# Patient Record
Sex: MALE | Race: WHITE | Employment: OTHER | URBAN - METROPOLITAN AREA
[De-identification: names, ages, dates, MRNs, and addresses within clinical notes are randomized per-mention and may not be internally consistent; named-entity substitution may affect disease eponyms.]

---

## 2017-03-07 ENCOUNTER — OFFICE VISIT (OUTPATIENT)
Dept: INTERNAL MEDICINE CLINIC | Age: 67
End: 2017-03-07

## 2017-03-07 VITALS
DIASTOLIC BLOOD PRESSURE: 90 MMHG | HEIGHT: 68 IN | HEART RATE: 83 BPM | WEIGHT: 203 LBS | SYSTOLIC BLOOD PRESSURE: 148 MMHG | TEMPERATURE: 97.9 F | RESPIRATION RATE: 18 BRPM | OXYGEN SATURATION: 95 % | BODY MASS INDEX: 30.77 KG/M2

## 2017-03-07 DIAGNOSIS — Z00.00 ROUTINE GENERAL MEDICAL EXAMINATION AT A HEALTH CARE FACILITY: Primary | ICD-10-CM

## 2017-03-07 NOTE — MR AVS SNAPSHOT
Visit Information Date & Time Provider Department Dept. Phone Encounter #  
 3/7/2017  9:00 AM Billie Pickering, [address] [id number] Follow-up Instructions Return in about 6 months (around 9/7/2017) for follow up htn, hld, and hypothyroid. Your Appointments 3/9/2017  9:30 AM  
6 MONTH with Hamzah Courtney MD  
[facility]) Appt Note: r/s'd w/ pt, Dr. Keke Rosales off; 110-11 pt resched appt to march, says they are doing okay-lj 10-11  
 [id number] [facility]  
[phone] [id number] [facility] Upcoming Health Maintenance Date Due Hepatitis C Screening 1950 DTaP/Tdap/Td series (1 - Tdap) 4/14/1972 FOBT Q 1 YEAR AGE 50-75 4/14/2001 ZOSTER VACCINE AGE 60> 4/14/2011 GLAUCOMA SCREENING Q2Y 4/14/2016 Pneumococcal 65+ Low/Medium Risk (1 of 2 - PCV13) 4/14/2016 MEDICARE YEARLY EXAM 4/14/2016 INFLUENZA AGE 9 TO ADULT 8/1/2016 Allergies as of 3/7/2017  Review Complete On: 3/7/2017 By: Billie Pickering MD  
 No Known Allergies Current Immunizations  Reviewed on 6/12/2012 No immunizations on file. Not reviewed this visit You Were Diagnosed With   
  
 Codes Comments Routine general medical examination at a health care facility    -  Primary ICD-10-CM: Z00.00 ICD-9-CM: V70.0 Vitals BP Pulse Temp Resp Height(growth percentile) Weight(growth percentile) 148/90 (BP 1 Location: Left arm, BP Patient Position: Sitting) 83 97.9 °F (36.6 °C) (Oral) 18 5' 8\" (1.727 m) 203 lb (92.1 kg) SpO2 BMI Smoking Status 95% 30.87 kg/m2 Former Smoker BMI and BSA Data Body Mass Index Body Surface Area  
 30.87 kg/m 2 2.1 m 2 Preferred Pharmacy Pharmacy Name Phone [facility] [address] [place] [address] Avenue [phone] Your Updated Medication List  
  
   
 This list is accurate as of: 3/7/17  9:59 AM.  Always use your most recent med list.  
  
  
  
  
 aspirin 81 mg chewable tablet Take 2 Tabs by mouth daily. atorvastatin 40 mg tablet Commonly known as:  LIPITOR Take 1 Tab by mouth daily. coenzyme Q-10 200 mg capsule Commonly known as:  CO Q-10 Take 1 Cap by mouth daily. Over the counter to help prevent leg aches  
  
 diclofenac 1 % Gel Commonly known as:  VOLTAREN Apply 4 g to affected area four (4) times daily. levothyroxine 150 mcg tablet Commonly known as:  SYNTHROID Take 1 Tab by mouth Daily (before breakfast). lisinopril 5 mg tablet Commonly known as:  Jacklynn Pares Take 1 Tab by mouth daily. metoprolol succinate 25 mg XL tablet Commonly known as:  TOPROL-XL Take 0.5 Tabs by mouth daily. nitroglycerin 0.4 mg SL tablet Commonly known as:  NITROSTAT  
1 Tab by SubLINGual route every five (5) minutes as needed for Chest Pain. pneumococcal 13 loulou conj dip 0.5 mL Syrg injection Commonly known as:  PREVNAR 13 (PF)  
0.5 mL by IntraMUSCular route PRIOR TO DISCHARGE for 1 dose. potassium 99 mg tablet Take 99 mg by mouth daily. Prescriptions Printed Refills  
 pneumococcal 13 loulou conj dip (PREVNAR 13, PF,) 0.5 mL syrg injection 0 Si.5 mL by IntraMUSCular route PRIOR TO DISCHARGE for 1 dose. Class: Print Route: IntraMUSCular We Performed the Following CBC WITH AUTOMATED DIFF [25264 CPT(R)] HEMOGLOBIN A1C WITH EAG [73058 CPT(R)] LIPID PANEL [65846 CPT(R)] METABOLIC PANEL, COMPREHENSIVE [16286 CPT(R)] PROSTATE SPECIFIC AG (PSA) C8124845 CPT(R)] T4, FREE C143362 CPT(R)] TSH 3RD GENERATION [05445 CPT(R)] Follow-up Instructions Return in about 6 months (around 2017) for follow up htn, hld, and hypothyroid. To-Do List   
 2017 Imaging:  XR CHEST PA LAT Introducing [address]! Dear Zahra [place]: Thank you for requesting a [facility] account. Our records indicate that you already have an active [facility] account. You can access your account anytime at [email or web address] Did you know that you can access your hospital and ER discharge instructions at any time in [facility]? You can also review all of your test results from your hospital stay or ER visit. Additional Information If you have questions, please visit the Frequently Asked Questions section of the [facility] website at [email or web address] Remember, [facility] is NOT to be used for urgent needs. For medical emergencies, dial 911. Now available from your iPhone and Android! Please provide this summary of care documentation to your next provider. Your primary care clinician is listed as Nirmala Todd. If you have any questions after today's visit, please call [phone].

## 2017-03-07 NOTE — PROGRESS NOTES
1. Have you been to the ER, urgent care clinic since your last visit? Hospitalized since your last visit?no    2. Have you seen or consulted any other health care providers outside of the [address] [place] [address] since your last visit? Include any pap smears or colon screening.  no

## 2017-03-07 NOTE — PROGRESS NOTES
SUBJECTIVE:   Delmar Lennon is a 73 y.o. male who is here for complete physical exam. Pt is not fasting today. Pt's BP is slightly elevated at 148/90 today. Pt c/o fatigue. Pt states he has been feeling dizzy recently. Pt reports having an appointment with Dr. Betzy Shell (cardiology) on Thursday. Pt reports last time he felt like this he had walking pneumonia. Pt reports he had a productive cough the past few weeks. Pt states his last labs were over 400 dollars. Pt denies seeing urology for elevated PSA. Pt's PSA was 7.8 on 2/23/16. Pt states he has the contact information for Dr. Mark Mckeon (urology). Pt reports episodes of blurred vision x years. Pt denies seeing ophthalmology. Pt states he has had some abdominal issues recently. Pt endorses having occasional nausea and heart burn. Pt claims he has noticed memory changes. Pt states he sniffed cocaine in the 1970s and is wondering if this is the cause of the memory changes. Pt reports taking Potassium for his joints. Pt endorses taking Aleve for occasional knee pain. Hep C: not ordered today due to cost.   Tdap: due  Zostavax: due  Pneumonia: due. Pt specifically denies changes in vision or hearing, trouble with swallowing or taste, CP, SOB, heartburn or upset stomach, change in bowel habits, problems urinating, unusual joint or muscle pains, numbness or tingling in extremities, or skin lesions of concern. At this time, he is otherwise doing well and has brought no other complaints to my attention today. For a list of the medical issues addressed today, see the assessment and plan below.       PMH:   Past Medical History:   Diagnosis Date    HTN (hypertension)     Hypercholesterolemia     NSTEMI (non-ST elevated myocardial infarction) ([facility]     Other ill-defined conditions(799.89)     thyroid, high cholesterol    Thyroid disease     Troponin I above reference range        PSH:  has a past surgical history that includes heent. Allergies: has No Known Allergies. Meds:   Current Outpatient Prescriptions   Medication Sig    potassium 99 mg tablet Take 99 mg by mouth daily.  pneumococcal 13 loulou conj dip (PREVNAR 13, PF,) 0.5 mL syrg injection 0.5 mL by IntraMUSCular route PRIOR TO DISCHARGE for 1 dose.  levothyroxine (SYNTHROID) 150 mcg tablet Take 1 Tab by mouth Daily (before breakfast).  lisinopril (PRINIVIL, ZESTRIL) 5 mg tablet Take 1 Tab by mouth daily.  metoprolol succinate (TOPROL-XL) 25 mg XL tablet Take 0.5 Tabs by mouth daily.  atorvastatin (LIPITOR) 40 mg tablet Take 1 Tab by mouth daily.  coenzyme Q-10 (CO Q-10) 200 mg capsule Take 1 Cap by mouth daily. Over the counter to help prevent leg aches    nitroglycerin (NITROSTAT) 0.4 mg SL tablet 1 Tab by SubLINGual route every five (5) minutes as needed for Chest Pain.  aspirin 81 mg chewable tablet Take 2 Tabs by mouth daily.  diclofenac (VOLTAREN) 1 % gel Apply 4 g to affected area four (4) times daily. No current facility-administered medications for this visit. Fam hx: family history includes Heart Disease in his father; Hypertension in his mother; Stroke in his mother. Soc hx:  reports that he quit smoking about 27 years ago. He quit after 20.00 years of use. He has never used smokeless tobacco. He reports that he drinks alcohol. He reports that he does not use illicit drugs.       Review of Systems - History obtained from the patient  General ROS: +fatigue, otherwise negative  Psychological ROS: negative  Ophthalmic ROS: negative  ENT ROS: negative  Respiratory ROS: no cough, shortness of breath, or wheezing  Cardiovascular ROS: no chest pain or dyspnea on exertion  Gastrointestinal ROS: no abdominal pain, change in bowel habits, or black or bloody stools  Genito-Urinary ROS: negative  Musculoskeletal ROS: negative  Neurological ROS: negative  Dermatological ROS: negative    OBJECTIVE:   Vitals:   Visit Vitals    /90 (BP 1 Location: Left arm, BP Patient Position: Sitting)    Pulse 83    Temp 97.9 °F (36.6 °C) (Oral)    Resp 18    Ht 5' 8\" (1.727 m)    Wt 203 lb (92.1 kg)    SpO2 95%    BMI 30.87 kg/m2     Gen: Pleasant 73 y.o. male in NAD. HEENT: PERRLA. EOMI. OP moist and pink. EARS: TMs normal and canals equal bilaterally. NECK: Supple. No LAD. No thyromegaly. HEART: RRR, No M/G/R.   LUNGS: CTAB No W/R. ABDOMEN: S, NT, ND, BS+. EXTREMITIES: Warm. No C/C/E.  MUSCULOSKELETAL: Normal ROM, muscle strength 5/5 all groups. NEURO: Alert and oriented x 3. Cranial nerves grossly intact. No focal sensory or motor deficits noted. SKIN: Warm. Dry. No rashes or other lesions noted. ASSESSMENT/ PLAN:     Godfrey Jiménez was seen today for complete physical and other. Diagnoses and all orders for this visit:    Routine general medical examination at a health care facility  -     METABOLIC PANEL, COMPREHENSIVE  -     LIPID PANEL  -     T4, FREE  -     CBC WITH AUTOMATED DIFF  -     TSH 3RD GENERATION  -     PSA - PROSTATE SPECIFIC AG  -     HEMOGLOBIN A1C WITH EAG  -     pneumococcal 13 loulou conj dip (PREVNAR 13, PF,) 0.5 mL syrg injection; 0.5 mL by IntraMUSCular route PRIOR TO DISCHARGE for 1 dose. -     XR CHEST PA LAT; Future      Pt was advised to see urology for elevated PSA. Pt may hold on Hgb A1C and CBC labs to decrease cost.     I advised the pt to consider getting the pneumonia vaccines. Pt was given rxs for Prevnar-13 for routine vaccination. Pt was instructed to sign up for Medicare to help cover health care costs. I ordered a chest XR for investigation of fatigue. Monse Pena's physical exam was normal and urinalysis was clear. Pt was given lab orders for a CBC, CMP, lipid panel, PSA, Hgb A1C, T4, and TSH to have done when he is fasting. Pt will f/u in 6 months for htn, hld, and hypothyroid.      Follow-up Disposition:  Return in about 6 months (around 9/7/2017) for follow up htn, hld, and hypothyroid. I have reviewed the patient's medications and risks/side effects/benefits were discussed. Diagnosis(-es) explained to patient and questions answered. Literature provided where appropriate.      Written by Golden Osler, as dictated by Abbie Kelly MD.

## 2017-03-09 ENCOUNTER — OFFICE VISIT (OUTPATIENT)
Dept: CARDIOLOGY CLINIC | Age: 67
End: 2017-03-09

## 2017-03-09 ENCOUNTER — APPOINTMENT (OUTPATIENT)
Dept: INTERNAL MEDICINE CLINIC | Age: 67
End: 2017-03-09

## 2017-03-09 VITALS
DIASTOLIC BLOOD PRESSURE: 82 MMHG | SYSTOLIC BLOOD PRESSURE: 132 MMHG | HEIGHT: 68 IN | HEART RATE: 82 BPM | BODY MASS INDEX: 30.9 KG/M2 | RESPIRATION RATE: 18 BRPM | WEIGHT: 203.9 LBS | OXYGEN SATURATION: 96 %

## 2017-03-09 DIAGNOSIS — E78.00 HYPERCHOLESTEROLEMIA: ICD-10-CM

## 2017-03-09 DIAGNOSIS — I10 ESSENTIAL HYPERTENSION: Chronic | ICD-10-CM

## 2017-03-09 DIAGNOSIS — I25.10 CORONARY ARTERY DISEASE INVOLVING NATIVE CORONARY ARTERY OF NATIVE HEART, ANGINA PRESENCE UNSPECIFIED: ICD-10-CM

## 2017-03-09 DIAGNOSIS — I21.4 NSTEMI (NON-ST ELEVATED MYOCARDIAL INFARCTION) (HCC): ICD-10-CM

## 2017-03-09 DIAGNOSIS — Z98.61 HISTORY OF PTCA: ICD-10-CM

## 2017-03-09 DIAGNOSIS — E07.9 THYROID DISEASE: ICD-10-CM

## 2017-03-09 DIAGNOSIS — I20.9 ANGINA PECTORIS (HCC): Primary | ICD-10-CM

## 2017-03-09 RX ORDER — NITROGLYCERIN 0.4 MG/1
0.4 TABLET SUBLINGUAL
Qty: 25 TAB | Refills: 1 | Status: SHIPPED | OUTPATIENT
Start: 2017-03-09

## 2017-03-09 NOTE — PROGRESS NOTES
Romaine Esqueda NP  Subjective/HPI:     Rena Garcia is a 73 y.o. male is here for routine f/u. The patient denies chest pain/ shortness of breath, orthopnea, PND, LE edema, palpitations, syncope, presyncope. Mr. Giselle Marroquin reports he is having significant episodes of overall weakness and fatigue and admits to waxing and waning episodes of some chest tightness without any specific triggers that her pain bothering him for several months. He states he has been seen by primary care and is pending lab evaluation and chest x-ray as this is how he felt with previous episodes of pneumonia. PCP Provider  Brittany Schwarz MD  Past Medical History:   Diagnosis Date    HTN (hypertension)     Hypercholesterolemia     NSTEMI (non-ST elevated myocardial infarction) ([facility] [address]     Other ill-defined conditions(799.89)     thyroid, high cholesterol    Thyroid disease     Troponin I above reference range       Past Surgical History:   Procedure Laterality Date    HX HEENT      tonsils     No Known Allergies   Family History   Problem Relation Age of Onset    Stroke Mother     Hypertension Mother     Heart Disease Father       Current Outpatient Prescriptions   Medication Sig    nitroglycerin (NITROSTAT) 0.4 mg SL tablet 1 Tab by SubLINGual route every five (5) minutes as needed for Chest Pain (call 911 if not relieved by 3).  potassium 99 mg tablet Take 99 mg by mouth daily.  pneumococcal 13 loulou conj dip (PREVNAR 13, PF,) 0.5 mL syrg injection 0.5 mL by IntraMUSCular route PRIOR TO DISCHARGE for 1 dose.  levothyroxine (SYNTHROID) 150 mcg tablet Take 1 Tab by mouth Daily (before breakfast).  lisinopril (PRINIVIL, ZESTRIL) 5 mg tablet Take 1 Tab by mouth daily.  metoprolol succinate (TOPROL-XL) 25 mg XL tablet Take 0.5 Tabs by mouth daily.  atorvastatin (LIPITOR) 40 mg tablet Take 1 Tab by mouth daily.  coenzyme Q-10 (CO Q-10) 200 mg capsule Take 1 Cap by mouth daily.  Over the counter to help prevent leg aches    aspirin 81 mg chewable tablet Take 2 Tabs by mouth daily.  diclofenac (VOLTAREN) 1 % gel Apply 4 g to affected area four (4) times daily. No current facility-administered medications for this visit. Vitals:    03/09/17 0909 03/09/17 0927   BP: 136/82 132/82   Pulse: 82    Resp: 18    SpO2: 96%    Weight: 203 lb 14.4 oz (92.5 kg)    Height: 5' 8\" (1.727 m)      Social History     Social History    Marital status: SINGLE     Spouse name: N/A    Number of children: N/A    Years of education: N/A     Occupational History    Not on file. Social History Main Topics    Smoking status: Former Smoker     Years: 20.00     Quit date: 1/1/1990    Smokeless tobacco: Never Used    Alcohol use Yes    Drug use: No    Sexual activity: Yes     Partners: Male     Other Topics Concern    Not on file     Social History Narrative       I have reviewed the nurses notes, vitals, problem list, allergy list, medical history, family, social history and medications. Review of Symptoms:    General: Pt denies excessive weight gain or loss. Pt is able to conduct ADL's patient reporting fatigue  HEENT: Denies blurred vision, headaches, epistaxis and difficulty swallowing. Respiratory: Denies shortness of breath, POWELL, wheezing or stridor. Cardiovascular: Positive chest pain, denies palpitations, edema or PND  Gastrointestinal: Denies poor appetite, indigestion, abdominal pain or blood in stool  Musculoskeletal: Denies pain or swelling from muscles or joints  Neurologic: Denies tremor, paresthesias. Reported to nurse episodes of lightheadedness however he states this seldom occurs  Skin: Denies rash, itching or texture change. Physical Exam:      General: Well developed, in no acute distress, cooperative and alert  HEENT: No carotid bruits, no JVD, trach is midline. Neck Supple, PEERL, EOM intact. Heart:  Normal S1/S2 negative S3 or S4.  Regular, no murmur, gallop or rub.   Respiratory: Clear bilaterally x 4, no wheezing or rales  Abdomen:   Soft, non-tender, no masses, bowel sounds are active.   Extremities:  No edema, normal cap refill, no cyanosis, atraumatic. Neuro: A&Ox3, speech clear, gait stable. Skin: Skin color is normal. No rashes or lesions. Non diaphoretic  Vascular: 2+ pulses symmetric in all extremities    Cardiographics    ECG: Sinus  Results for orders placed or performed during the hospital encounter of 06/12/12   EKG, 12 LEAD, INITIAL   Result Value Ref Range    Ventricular Rate 87 BPM    Atrial Rate 87 BPM    P-R Interval 150 ms    QRS Duration 96 ms    Q-T Interval 364 ms    QTC Calculation (Bezet) 438 ms    Calculated P Axis 47 degrees    Calculated R Axis -36 degrees    Calculated T Axis 58 degrees    Diagnosis       Normal sinus rhythm  Left axis deviation  Incomplete right bundle branch block    Confirmed by Justo Barney ([id number]) on 6/14/2012 12:35:09 PM         Cardiology Labs:  Lab Results   Component Value Date/Time    Cholesterol, total 136 03/09/2017 11:09 AM    HDL Cholesterol 39 03/09/2017 11:09 AM    LDL,Direct 131 05/29/2014 10:38 AM    LDL, calculated 77 03/09/2017 11:09 AM    Triglyceride 99 03/09/2017 11:09 AM    CHOL/HDL Ratio 5.4 06/13/2012 04:45 AM       Lab Results   Component Value Date/Time    Sodium 141 03/09/2017 11:09 AM    Potassium 5.2 03/09/2017 11:09 AM    Chloride 104 03/09/2017 11:09 AM    CO2 23 03/09/2017 11:09 AM    Anion gap 6 06/14/2012 05:00 AM    Glucose 96 03/09/2017 11:09 AM    BUN 18 03/09/2017 11:09 AM    Creatinine 1.15 03/09/2017 11:09 AM    BUN/Creatinine ratio 16 03/09/2017 11:09 AM    GFR est AA 77 03/09/2017 11:09 AM    GFR est non-AA 66 03/09/2017 11:09 AM    Calcium 9.6 03/09/2017 11:09 AM    Bilirubin, total 1.1 03/09/2017 11:09 AM    AST (SGOT) 20 03/09/2017 11:09 AM    Alk.  phosphatase 60 03/09/2017 11:09 AM    Protein, total 6.8 03/09/2017 11:09 AM    Albumin 4.3 03/09/2017 11:09 AM    Globulin 3.8 06/13/2012 04:45 AM    RICCI Ratio 1.7 03/09/2017 11:09 AM    ALT (SGPT) 24 03/09/2017 11:09 AM           Assessment:     Assessment:     Alexis Vasquez was seen today for hypertension. Diagnoses and all orders for this visit:    Angina pectoris ([facility]    Essential hypertension  -     AMB POC EKG ROUTINE W/ 12 LEADS, INTER & REP  -     ECHO TTE STRESS EXRCSE COMP W OR WO CONTR; Future    [facility]STEM[facility] (non-ST elevated [facility] infarction) [facility]  -     ECHO TTE STRESS EXRCSE COMP W OR WO CONTR; Future    Hypercholesterolemia  -     ECHO TTE STRESS EXRCSE COMP W OR WO CONTR; Future    Thyroid disease  -     ECHO TTE STRESS EXRCSE COMP W OR WO CONTR; Future    Coronary artery disease involving native coronary artery of native heart, angina presence unspecified    History of PTCA    Other orders  -     Discontinue: apixaban (ELIQUIS) 5 mg tablet; Take 1 Tab by mouth two (2) times a day. Start and notify [facility] Coumadin clinic if affordable  -     nitroglycerin (NITROSTAT) 0.4 mg SL tablet; 1 Tab by SubLINGual route every five (5) minutes as needed for Chest Pain (call 911 if not relieved by 3). ICD-10-CM ICD-9-CM    1. Angina pectoris (HCC) I20.9 413.9    2. Essential hypertension I10 401.9 AMB POC EKG ROUTINE W/ 12 LEADS, INTER & REP      ECHO TTE STRESS EXRCSE COMP W OR WO CONTR   3. NSTEMI (non-ST elevated myocardial infarction) (HCC) I21.4 410.70 ECHO TTE STRESS EXRCSE COMP W OR WO CONTR   4. Hypercholesterolemia E78.00 272.0 ECHO TTE STRESS EXRCSE COMP W OR WO CONTR   5. Thyroid disease E07.9 246.9 ECHO TTE STRESS EXRCSE COMP W OR WO CONTR   6. Coronary artery disease involving native coronary artery of native heart, angina presence unspecified I25.10 414.01    7.  History of PTCA Z98.61 V45.82      Orders Placed This Encounter    AMB POC EKG ROUTINE W/ 12 LEADS, INTER & REP     Order Specific Question:   Reason for Exam:     Answer:   ROUTINE    ECHO TTE STRESS EXRCSE COMP W OR WO CONTR     Standing Status:   Future     Standing Expiration Date: 2017     Order Specific Question:   Reason for Exam:     Answer:   fatigue and chest tightness     Order Specific Question:   Contrast Enhancement (Bubble Study, Definity, Optison) may be used if criteria listed in established evidence-based protocol has been identified. Answer: Yes                       nitroglycerin (NITROSTAT) 0.4 mg SL tablet     Si Tab by SubLINGual route every five (5) minutes as needed for Chest Pain (call 911 if not relieved by 3). Dispense:  25 Tab     Refill:  1        Plan:     Patient reports increasing overwhelming fatigue and episodes of chest discomfort without any specific triggers. History of coronary artery disease/NSTEMI will evaluate for ischemia with stress echo. Continue current medications has outstanding lab slip from primary care to check lipids.     Marleni Mack MD

## 2017-03-09 NOTE — MR AVS SNAPSHOT
Visit Information Date & Time Provider Department Dept. Phone Encounter #  
 3/9/2017  9:30 AM Luis Alberto Medellin, 1024 [facility] [phone] [id number] Your Appointments 3/20/2017  9:00 AM  
STRESS ECHOCARDIOGRAMS with STRESSECHO, [facility] [address]) Appt Note: Per Dr Eryn Sweet 5'8\", 203. 14LBS. ECHO TTE STRESS EXRCSE COMP W OR WO CONTR [34122 Custom] [address]  
[phone] [address] Upcoming Health Maintenance Date Due Hepatitis C Screening 1950 DTaP/Tdap/Td series (1 - Tdap) 4/14/1972 FOBT Q 1 YEAR AGE 50-75 4/14/2001 ZOSTER VACCINE AGE 60> 4/14/2011 GLAUCOMA SCREENING Q2Y 4/14/2016 Pneumococcal 65+ Low/Medium Risk (1 of 2 - PCV13) 4/14/2016 MEDICARE YEARLY EXAM 4/14/2016 INFLUENZA AGE 9 TO ADULT 8/1/2016 Allergies as of 3/9/2017  Review Complete On: 3/9/2017 By: Luis Alberto Medellin MD  
 No Known Allergies Current Immunizations  Reviewed on 6/12/2012 No immunizations on file. Not reviewed this visit You Were Diagnosed With   
  
 Codes Comments Essential hypertension    -  Primary ICD-10-CM: I10 
ICD-9-CM: 401.9 NSTEMI (non-ST elevated myocardial infarction) ([facility]     ICD-10-CM: I21.4 ICD-9-CM: 410.70 Hypercholesterolemia     ICD-10-CM: E78.00 ICD-9-CM: 272.0 Thyroid disease     ICD-10-CM: E07.9 ICD-9-CM: 246. 9 Vitals BP Pulse Resp Height(growth percentile) Weight(growth percentile) SpO2  
 132/82 (BP 1 Location: Right arm, BP Patient Position: Sitting) 82 18 5' 8\" (1.727 m) 203 lb 14.4 oz (92.5 kg) 96% BMI Smoking Status 31 kg/m2 Former Smoker Vitals History BMI and BSA Data Body Mass Index Body Surface Area  
 31 kg/m 2 2.11 m 2 Preferred Pharmacy Pharmacy Name Phone [facility] [address] [phone] Your Updated Medication List  
  
   
This list is accurate as of: 3/9/17 11:24 AM.  Always use your most recent med list.  
  
  
  
  
 apixaban 5 mg tablet Commonly known as:  Corrina Niskye Take 1 Tab by mouth two (2) times a day. Start and notify [facility] if affordable  
  
 aspirin 81 mg chewable tablet Take 2 Tabs by mouth daily. atorvastatin 40 mg tablet Commonly known as:  LIPITOR Take 1 Tab by mouth daily. coenzyme Q-10 200 mg capsule Commonly known as:  CO Q-10 Take 1 Cap by mouth daily. Over the counter to help prevent leg aches  
  
 diclofenac 1 % Gel Commonly known as:  VOLTAREN Apply 4 g to affected area four (4) times daily. levothyroxine 150 mcg tablet Commonly known as:  SYNTHROID Take 1 Tab by mouth Daily (before breakfast). lisinopril 5 mg tablet Commonly known as:  Donnald Code Take 1 Tab by mouth daily. metoprolol succinate 25 mg XL tablet Commonly known as:  TOPROL-XL Take 0.5 Tabs by mouth daily. nitroglycerin 0.4 mg SL tablet Commonly known as:  NITROSTAT  
1 Tab by SubLINGual route every five (5) minutes as needed for Chest Pain (call 911 if not relieved by 3). pneumococcal 13 loulou conj dip 0.5 mL Syrg injection Commonly known as:  PREVNAR 13 (PF)  
0.5 mL by IntraMUSCular route PRIOR TO DISCHARGE for 1 dose. potassium 99 mg tablet Take 99 mg by mouth daily. Prescriptions Printed Refills  
 apixaban (ELIQUIS) 5 mg tablet 3 Sig: Take 1 Tab by mouth two (2) times a day. Start and notify [facility] if affordable Class: Print Route: Oral  
  
Prescriptions Sent to Pharmacy Refills  
 nitroglycerin (NITROSTAT) 0.4 mg SL tablet 1 Si Tab by SubLINGual route every five (5) minutes as needed for Chest Pain (call 911 if not relieved by 3).   
 Class: Normal  
 Pharmacy: [address] Rod, [address] [facility] Ph #: [phone] Route: SubLINGual  
  
We Performed the Following AMB POC EKG ROUTINE W/ 12 LEADS, INTER & REP [20777 CPT(R)] To-Do List   
 03/10/2017 ECHO:  ECHO TTE STRESS EXRCSE COMP W OR WO CONTR Introducing [facility]! Dear Elicia Tejada: 
Thank you for requesting a Premise account. Our records indicate that you already have an active Premise account. You can access your account anytime at [email or web address] Did you know that you can access your hospital and ER discharge instructions at any time in Premise? You can also review all of your test results from your hospital stay or ER visit. Additional Information If you have questions, please visit the Frequently Asked Questions section of the Premise website at [email or web address] Remember, Premise is NOT to be used for urgent needs. For medical emergencies, dial 911. Now available from your iPhone and Android! Please provide this summary of care documentation to your next provider. Your primary care clinician is listed as Jessica Perez. If you have any questions after today's visit, please call [phone].

## 2017-03-10 LAB
ALBUMIN SERPL-MCNC: 4.3 G/DL (ref 3.6–4.8)
ALBUMIN/GLOB SERPL: 1.7 {RATIO} (ref 1.1–2.5)
ALP SERPL-CCNC: 60 IU/L (ref 39–117)
ALT SERPL-CCNC: 24 IU/L (ref 0–44)
AST SERPL-CCNC: 20 IU/L (ref 0–40)
BASOPHILS # BLD AUTO: 0 X10E3/UL (ref 0–0.2)
BASOPHILS NFR BLD AUTO: 1 %
BILIRUB SERPL-MCNC: 1.1 MG/DL (ref 0–1.2)
BUN SERPL-MCNC: 18 MG/DL (ref 8–27)
BUN/CREAT SERPL: 16 (ref 10–22)
CALCIUM SERPL-MCNC: 9.6 MG/DL (ref 8.6–10.2)
CHLORIDE SERPL-SCNC: 104 MMOL/L (ref 96–106)
CHOLEST SERPL-MCNC: 136 MG/DL (ref 100–199)
CO2 SERPL-SCNC: 23 MMOL/L (ref 18–29)
CREAT SERPL-MCNC: 1.15 MG/DL (ref 0.76–1.27)
EOSINOPHIL # BLD AUTO: 0.3 X10E3/UL (ref 0–0.4)
EOSINOPHIL NFR BLD AUTO: 3 %
ERYTHROCYTE [DISTWIDTH] IN BLOOD BY AUTOMATED COUNT: 12.8 % (ref 12.3–15.4)
EST. AVERAGE GLUCOSE BLD GHB EST-MCNC: 123 MG/DL
GLOBULIN SER CALC-MCNC: 2.5 G/DL (ref 1.5–4.5)
GLUCOSE SERPL-MCNC: 96 MG/DL (ref 65–99)
HBA1C MFR BLD: 5.9 % (ref 4.8–5.6)
HCT VFR BLD AUTO: 44.6 % (ref 37.5–51)
HDLC SERPL-MCNC: 39 MG/DL
HGB BLD-MCNC: 15 G/DL (ref 12.6–17.7)
IMM GRANULOCYTES # BLD: 0.1 X10E3/UL (ref 0–0.1)
IMM GRANULOCYTES NFR BLD: 1 %
LDLC SERPL CALC-MCNC: 77 MG/DL (ref 0–99)
LYMPHOCYTES # BLD AUTO: 2.2 X10E3/UL (ref 0.7–3.1)
LYMPHOCYTES NFR BLD AUTO: 25 %
MCH RBC QN AUTO: 29.2 PG (ref 26.6–33)
MCHC RBC AUTO-ENTMCNC: 33.6 G/DL (ref 31.5–35.7)
MCV RBC AUTO: 87 FL (ref 79–97)
MONOCYTES # BLD AUTO: 0.6 X10E3/UL (ref 0.1–0.9)
MONOCYTES NFR BLD AUTO: 7 %
NEUTROPHILS # BLD AUTO: 5.5 X10E3/UL (ref 1.4–7)
NEUTROPHILS NFR BLD AUTO: 63 %
PLATELET # BLD AUTO: 237 X10E3/UL (ref 150–379)
POTASSIUM SERPL-SCNC: 5.2 MMOL/L (ref 3.5–5.2)
PROT SERPL-MCNC: 6.8 G/DL (ref 6–8.5)
PSA SERPL-MCNC: 9.9 NG/ML (ref 0–4)
RBC # BLD AUTO: 5.14 X10E6/UL (ref 4.14–5.8)
SODIUM SERPL-SCNC: 141 MMOL/L (ref 134–144)
T4 FREE SERPL-MCNC: 1.56 NG/DL (ref 0.82–1.77)
TRIGL SERPL-MCNC: 99 MG/DL (ref 0–149)
TSH SERPL DL<=0.005 MIU/L-ACNC: 0.3 UIU/ML (ref 0.45–4.5)
VLDLC SERPL CALC-MCNC: 20 MG/DL (ref 5–40)
WBC # BLD AUTO: 8.6 X10E3/UL (ref 3.4–10.8)

## 2017-03-12 PROBLEM — Z98.61 HISTORY OF PTCA: Status: ACTIVE | Noted: 2017-03-12

## 2017-03-12 PROBLEM — I25.10 CORONARY ARTERY DISEASE INVOLVING NATIVE CORONARY ARTERY OF NATIVE HEART: Status: ACTIVE | Noted: 2017-03-12

## 2017-03-27 NOTE — PROGRESS NOTES
CBC-Normal red and white blood cell levels. CMP-Normal electrolyte levels, renal, and liver function. Lipids-normal   A1c-Your A1c is now in the prediabetic range. Thyroid function-Normal T4  TSH-normal  PSA-Your PSA is higher than last year.  Please follow up with urology

## 2017-04-07 RX ORDER — LISINOPRIL 5 MG/1
TABLET ORAL
Qty: 90 TAB | Refills: 0 | Status: SHIPPED | OUTPATIENT
Start: 2017-04-07 | End: 2017-11-12 | Stop reason: SDUPTHER

## 2017-04-11 NOTE — TELEPHONE ENCOUNTER
Requested Prescriptions     Pending Prescriptions Disp Refills    levothyroxine (SYNTHROID) 150 mcg tablet 90 Tab 0     Sig: Take 1 Tab by mouth Daily (before breakfast).      Last OV-3/7/17  Next OV-n/s  Med refilled-12/27/16

## 2017-04-11 NOTE — TELEPHONE ENCOUNTER
From: Yana Benitez Mast  To: Jayden Gaona MD  Sent: 4/11/2017 1:46 PM EDT  Subject: Medication Renewal Request    Original authorizing provider: Jayden Gaona MD    [address] [place] [address] would like a refill of the following medications:  levothyroxine (SYNTHROID) 150 mcg tablet Jayden Gaona MD]    Preferred pharmacy: [facility] [address] 240[address] [facility]    Comment:  Please send 90 day refill to [facility]. Thank you!

## 2017-04-12 RX ORDER — LEVOTHYROXINE SODIUM 150 UG/1
150 TABLET ORAL
Qty: 90 TAB | Refills: 0 | Status: SHIPPED | OUTPATIENT
Start: 2017-04-12 | End: 2017-12-09 | Stop reason: SDUPTHER

## 2017-06-12 RX ORDER — METOPROLOL SUCCINATE 25 MG/1
TABLET, EXTENDED RELEASE ORAL
Qty: 45 TAB | Refills: 0 | Status: SHIPPED | OUTPATIENT
Start: 2017-06-12 | End: 2018-03-12 | Stop reason: SDUPTHER

## 2017-06-12 RX ORDER — ATORVASTATIN CALCIUM 40 MG/1
TABLET, FILM COATED ORAL
Qty: 90 TAB | Refills: 0 | Status: SHIPPED | OUTPATIENT
Start: 2017-06-12 | End: 2017-11-12 | Stop reason: SDUPTHER

## 2017-11-13 RX ORDER — LISINOPRIL 5 MG/1
TABLET ORAL
Qty: 90 TAB | Refills: 0 | Status: SHIPPED | OUTPATIENT
Start: 2017-11-13 | End: 2018-05-29 | Stop reason: SDUPTHER

## 2017-11-13 RX ORDER — ATORVASTATIN CALCIUM 40 MG/1
TABLET, FILM COATED ORAL
Qty: 90 TAB | Refills: 0 | Status: SHIPPED | OUTPATIENT
Start: 2017-11-13 | End: 2018-05-29 | Stop reason: SDUPTHER

## 2017-12-10 RX ORDER — LEVOTHYROXINE SODIUM 150 UG/1
TABLET ORAL
Qty: 90 TAB | Refills: 0 | Status: SHIPPED | OUTPATIENT
Start: 2017-12-10 | End: 2018-06-25 | Stop reason: SDUPTHER

## 2018-03-12 RX ORDER — METOPROLOL SUCCINATE 25 MG/1
TABLET, EXTENDED RELEASE ORAL
Qty: 45 TAB | Refills: 0 | Status: SHIPPED | OUTPATIENT
Start: 2018-03-12 | End: 2018-07-14 | Stop reason: SDUPTHER

## 2018-05-29 RX ORDER — LISINOPRIL 5 MG/1
TABLET ORAL
Qty: 90 TAB | Refills: 0 | Status: SHIPPED | OUTPATIENT
Start: 2018-05-29 | End: 2018-09-20 | Stop reason: SDUPTHER

## 2018-05-29 RX ORDER — ATORVASTATIN CALCIUM 40 MG/1
TABLET, FILM COATED ORAL
Qty: 90 TAB | Refills: 0 | Status: SHIPPED | OUTPATIENT
Start: 2018-05-29 | End: 2018-09-24 | Stop reason: SDUPTHER

## 2018-06-25 ENCOUNTER — OFFICE VISIT (OUTPATIENT)
Dept: CARDIOLOGY CLINIC | Age: 68
End: 2018-06-25

## 2018-06-25 VITALS
WEIGHT: 198.1 LBS | BODY MASS INDEX: 30.02 KG/M2 | OXYGEN SATURATION: 95 % | SYSTOLIC BLOOD PRESSURE: 112 MMHG | HEART RATE: 76 BPM | HEIGHT: 68 IN | DIASTOLIC BLOOD PRESSURE: 82 MMHG | RESPIRATION RATE: 16 BRPM

## 2018-06-25 DIAGNOSIS — I10 HYPERTENSION, UNSPECIFIED TYPE: Chronic | ICD-10-CM

## 2018-06-25 DIAGNOSIS — E03.1 CONGENITAL HYPOTHYROIDISM WITHOUT GOITER: Primary | Chronic | ICD-10-CM

## 2018-06-25 DIAGNOSIS — Z98.61 HISTORY OF PTCA: ICD-10-CM

## 2018-06-25 DIAGNOSIS — I21.4 NSTEMI (NON-ST ELEVATED MYOCARDIAL INFARCTION) (HCC): ICD-10-CM

## 2018-06-25 DIAGNOSIS — E07.9 THYROID DISEASE: ICD-10-CM

## 2018-06-25 DIAGNOSIS — I25.10 ASHD (ARTERIOSCLEROTIC HEART DISEASE): Primary | ICD-10-CM

## 2018-06-25 DIAGNOSIS — E78.2 MIXED HYPERLIPIDEMIA: ICD-10-CM

## 2018-06-25 DIAGNOSIS — Z00.00 ROUTINE GENERAL MEDICAL EXAMINATION AT A HEALTH CARE FACILITY: ICD-10-CM

## 2018-06-25 NOTE — PROGRESS NOTES
Chief Complaint   Patient presents with    Hypertension     annual follow up, c/o chest discomfort at times says feels like indigestion     1. Have you been to the ER, urgent care clinic since your last visit? Hospitalized since your last visit? No    2. Have you seen or consulted any other health care providers outside of the [address] [place] [address] since your last visit? Include any pap smears or colon screening.  No

## 2018-06-25 NOTE — PROGRESS NOTES
[address] [facility]  [phone]     Subjective:      Rosalia Molina is a 80 y.o. male is here for routine f/u. The patient denies chest pain, orthopnea, PND, LE edema, palpitations, syncope, or presyncope. Reports occasional mild POWELL. Cuts trees / heavy yardwork with no problem    Patient Active Problem List    Diagnosis Date Noted    Coronary artery disease involving native coronary artery of native heart 03/12/2017    History of PTCA 03/12/2017    Routine general medical examination at a health care facility 03/07/2017    Hypercholesterolemia     Thyroid disease     NSTEMI (non-ST elevated myocardial infarction) ([facility] 06/12/2012    HTN (hypertension) 06/12/2012    Troponin level elevated 06/12/2012    Hypothyroidism 06/12/2012      Hector Swann MD  Past Medical History:   Diagnosis Date    HTN (hypertension)     Hypercholesterolemia     NSTEMI (non-ST elevated myocardial infarction) ([facility]     Other ill-defined conditions(799.89)     thyroid, high cholesterol    Thyroid disease     Troponin I above reference range       Past Surgical History:   Procedure Laterality Date    HX HEENT      tonsils     No Known Allergies   Family History   Problem Relation Age of Onset    Stroke Mother     Hypertension Mother     Heart Disease Father       Social History     Social History    Marital status: SINGLE     Spouse name: N/A    Number of children: N/A    Years of education: N/A     Occupational History    Not on file.      Social History Main Topics    Smoking status: Former Smoker     Years: 20.00     Quit date: 1/1/1990    Smokeless tobacco: Never Used    Alcohol use Yes    Drug use: No    Sexual activity: Yes     Partners: Male     Other Topics Concern    Not on file     Social History Narrative      Current Outpatient Prescriptions   Medication Sig    atorvastatin (LIPITOR) 40 mg tablet TAKE ONE TABLET BY MOUTH ONCE DAILY    lisinopril (PRINIVIL, ZESTRIL) 5 mg tablet TAKE ONE TABLET BY MOUTH ONCE DAILY    metoprolol succinate (TOPROL-XL) 25 mg XL tablet TAKE ONE-HALF TABLET BY MOUTH ONCE DAILY    levothyroxine (SYNTHROID) 150 mcg tablet TAKE ONE TABLET BY MOUTH ONCE DAILY BEFORE BREAKFAST    nitroglycerin (NITROSTAT) 0.4 mg SL tablet 1 Tab by SubLINGual route every five (5) minutes as needed for Chest Pain (call 911 if not relieved by 3).  pneumococcal 13 loulou conj dip (PREVNAR 13, PF,) 0.5 mL syrg injection 0.5 mL by IntraMUSCular route PRIOR TO DISCHARGE for 1 dose.  aspirin 81 mg chewable tablet Take 2 Tabs by mouth daily. No current facility-administered medications for this visit. Review of Symptoms:  11 systems reviewed, negative other than as stated in the HPI    Physical ExamPhysical Exam:    Vitals:    06/25/18 1009 06/25/18 1018   BP: 118/80 112/82   Pulse: 76    Resp: 16    SpO2: 95%    Weight: 198 lb 1.6 oz (89.9 kg)    Height: 5' 8\" (1.727 m)      Body mass index is 30.12 kg/(m^2). General PE   Gen:  NAD  Mental Status - Alert. General Appearance - Not in acute distress. Chest and Lung Exam   Inspection: Accessory muscles - No use of accessory muscles in breathing. Auscultation:   Breath sounds: - Normal.   Cardiovascular   Inspection: Jugular vein - Bilateral - Inspection Normal.   Palpation/Percussion:   Apical Impulse: - Normal.   Auscultation: Rhythm - Regular. Heart Sounds - S1 WNL and S2 WNL. No S3 or S4. Murmurs & Other Heart Sounds: Auscultation of the heart reveals - No Murmurs. Peripheral Vascular   Upper Extremity: Inspection - Bilateral - No Cyanotic nailbeds or Digital clubbing. Lower Extremity:   Palpation: Edema - Bilateral - No edema. Abdomen:   Soft, non-tender, bowel sounds are active.   Neuro: A&O times 3, CN and motor grossly WNL    Labs:   Lab Results   Component Value Date/Time    Cholesterol, total 136 03/09/2017 11:09 AM    Cholesterol, total 160 08/17/2015 09:41 AM Cholesterol, total 178 05/29/2014 10:38 AM    Cholesterol, total 175 06/07/2013 10:10 AM    Cholesterol, total 194 06/13/2012 04:45 AM    HDL Cholesterol 39 (L) 03/09/2017 11:09 AM    HDL Cholesterol 50 08/17/2015 09:41 AM    HDL Cholesterol 44 05/29/2014 10:38 AM    HDL Cholesterol 40 06/07/2013 10:10 AM    HDL Cholesterol 36 06/13/2012 04:45 AM    LDL,Direct 131 (H) 05/29/2014 10:38 AM    LDL, calculated 77 03/09/2017 11:09 AM    LDL, calculated 93 08/17/2015 09:41 AM    LDL, calculated 110 (H) 05/29/2014 10:38 AM    LDL, calculated 119 (H) 06/07/2013 10:10 AM    LDL, calculated 132 (H) 06/13/2012 04:45 AM    Triglyceride 99 03/09/2017 11:09 AM    Triglyceride 84 08/17/2015 09:41 AM    Triglyceride 121 05/29/2014 10:38 AM    Triglyceride 81 06/07/2013 10:10 AM    Triglyceride 130 06/13/2012 04:45 AM    CHOL/HDL Ratio 5.4 (H) 06/13/2012 04:45 AM    CHOL/HDL Ratio 5.8 (H) 03/16/2010 09:35 AM    CHOL/HDL Ratio 5.4 (H) 06/19/2009 10:45 AM     Lab Results   Component Value Date/Time    CK 1050 (H) 06/13/2012 04:45 AM     Lab Results   Component Value Date/Time    Sodium 141 03/09/2017 11:09 AM    Potassium 5.2 03/09/2017 11:09 AM    Chloride 104 03/09/2017 11:09 AM    CO2 23 03/09/2017 11:09 AM    Anion gap 6 06/14/2012 05:00 AM    Glucose 96 03/09/2017 11:09 AM    BUN 18 03/09/2017 11:09 AM    Creatinine 1.15 03/09/2017 11:09 AM    BUN/Creatinine ratio 16 03/09/2017 11:09 AM    GFR est AA 77 03/09/2017 11:09 AM    GFR est non-AA 66 03/09/2017 11:09 AM    Calcium 9.6 03/09/2017 11:09 AM    Bilirubin, total 1.1 03/09/2017 11:09 AM    AST (SGOT) 20 03/09/2017 11:09 AM    Alk. phosphatase 60 03/09/2017 11:09 AM    Protein, total 6.8 03/09/2017 11:09 AM    Albumin 4.3 03/09/2017 11:09 AM    Globulin 3.8 06/13/2012 04:45 AM    A-G Ratio 1.7 03/09/2017 11:09 AM    ALT (SGPT) 24 03/09/2017 11:09 AM       EKG:  SR     Assessment:     Assessment:      1. ASHD (arteriosclerotic heart disease)    2.  Hypertension, unspecified type 3. Mixed hyperlipidemia    4. History of PTCA    5. NSTEMI (non-ST elevated myocardial infarction) ([facility]        Orders Placed This Encounter    CK    LIPID PANEL    METABOLIC PANEL, COMPREHENSIVE    AMB POC EKG ROUTINE W/ 12 LEADS, INTER & REP     Order Specific Question:   Reason for Exam:     Answer:   routine        Plan:     Pt presents for annual f/u    ASHD  6/13/13--PTCA/BMS to OM1, PTCA/BMS to mid RCA   Stress test cardiolyte without evidence of ischemia in 7/14  Unchanged occasional mild POWELL. He will call if any progression of symptoms. On ASA, BB, statin    HTN  Controlled with current therapy    Hx normal EF, mild MR per echo in 7/13    HLD  On statin. Will check labs    Counseled on diet and exercise- eventual goal of 30-60 minutes 5-7 times a week as per AHA guidelines. He is quite active, doing heavy labor at work. He will discuss his thyroid and ADHD with his PCP Dr. Teodora Wong.    Continue current care and f/u in 1 yr.      Lettiia Christiansen MD

## 2018-06-26 NOTE — TELEPHONE ENCOUNTER
From: Moira Pena  To: Ginger Guillen MD  Sent: 6/25/2018 11:01 AM EDT  Subject: Medication Renewal Request    Original authorizing provider: Ginger Guillen MD    [address] would like a refill of the following medications:  levothyroxine (SYNTHROID) 150 mcg tablet Ginger Guillen MD]    Preferred pharmacy: [address] [place] [address]    Comment:

## 2018-07-05 NOTE — TELEPHONE ENCOUNTER
Pt has made his appt for 7-20-18 (1st available)  Please fill script. Pt is also requesting lab orders to be put in system so he can come in prior to appt. Pt requesting a call when orders are in the system. Thanks.

## 2018-07-06 ENCOUNTER — TELEPHONE (OUTPATIENT)
Dept: INTERNAL MEDICINE CLINIC | Age: 68
End: 2018-07-06

## 2018-07-06 DIAGNOSIS — R73.01 IFG (IMPAIRED FASTING GLUCOSE): ICD-10-CM

## 2018-07-06 DIAGNOSIS — E03.1 CONGENITAL HYPOTHYROIDISM WITHOUT GOITER: Primary | ICD-10-CM

## 2018-07-06 RX ORDER — LEVOTHYROXINE SODIUM 150 UG/1
150 TABLET ORAL
Qty: 20 TAB | Refills: 0 | Status: SHIPPED | OUTPATIENT
Start: 2018-07-06 | End: 2018-07-20 | Stop reason: SDUPTHER

## 2018-07-06 NOTE — TELEPHONE ENCOUNTER
Spoke to [employer] (HIPAA). Kala Blanca asking if there are labs to be done prior to [facility]. Kala Blanca also asking if he would be able to get done what Dr. Gi Box ordered as well. Kala Blanca informed that only our providers can have labs drawn in our clinic. Kala Blanca informed pt can go to any other lab corps for collection. Kala Blanca informed labs from Dr. Rupesh Desouza have been ordered and will be mailed to pt in order to have Dr. Serjio Keita and Dr. Jenifer Chacon labs done together. Kala Blanca verbalized understanding of information discussed w/ no further questions at this time.

## 2018-07-15 RX ORDER — METOPROLOL SUCCINATE 25 MG/1
TABLET, EXTENDED RELEASE ORAL
Qty: 45 TAB | Refills: 0 | Status: SHIPPED | OUTPATIENT
Start: 2018-07-15 | End: 2018-11-05 | Stop reason: SDUPTHER

## 2018-07-19 LAB
ALBUMIN SERPL-MCNC: 4.2 G/DL (ref 3.6–4.8)
ALBUMIN/GLOB SERPL: 1.6 {RATIO} (ref 1.2–2.2)
ALP SERPL-CCNC: 56 IU/L (ref 39–117)
ALT SERPL-CCNC: 31 IU/L (ref 0–44)
AST SERPL-CCNC: 29 IU/L (ref 0–40)
BILIRUB SERPL-MCNC: 0.7 MG/DL (ref 0–1.2)
BUN SERPL-MCNC: 17 MG/DL (ref 8–27)
BUN/CREAT SERPL: 13 (ref 10–24)
CALCIUM SERPL-MCNC: 9 MG/DL (ref 8.6–10.2)
CHLORIDE SERPL-SCNC: 104 MMOL/L (ref 96–106)
CHOLEST SERPL-MCNC: 180 MG/DL (ref 100–199)
CK SERPL-CCNC: 306 U/L (ref 24–204)
CO2 SERPL-SCNC: 21 MMOL/L (ref 20–29)
CREAT SERPL-MCNC: 1.29 MG/DL (ref 0.76–1.27)
EST. AVERAGE GLUCOSE BLD GHB EST-MCNC: 117 MG/DL
GLOBULIN SER CALC-MCNC: 2.6 G/DL (ref 1.5–4.5)
GLUCOSE SERPL-MCNC: 96 MG/DL (ref 65–99)
HBA1C MFR BLD: 5.7 % (ref 4.8–5.6)
HDLC SERPL-MCNC: 42 MG/DL
INTERPRETATION, 910389: NORMAL
INTERPRETATION: NORMAL
LDLC SERPL CALC-MCNC: 120 MG/DL (ref 0–99)
PDF IMAGE, 910387: NORMAL
POTASSIUM SERPL-SCNC: 4.6 MMOL/L (ref 3.5–5.2)
PROT SERPL-MCNC: 6.8 G/DL (ref 6–8.5)
PSA SERPL-MCNC: 12.9 NG/ML (ref 0–4)
SODIUM SERPL-SCNC: 140 MMOL/L (ref 134–144)
T4 FREE SERPL-MCNC: 1.2 NG/DL (ref 0.82–1.77)
TRIGL SERPL-MCNC: 88 MG/DL (ref 0–149)
TSH SERPL DL<=0.005 MIU/L-ACNC: 10.03 UIU/ML (ref 0.45–4.5)
VLDLC SERPL CALC-MCNC: 18 MG/DL (ref 5–40)

## 2018-07-20 ENCOUNTER — OFFICE VISIT (OUTPATIENT)
Dept: INTERNAL MEDICINE CLINIC | Age: 68
End: 2018-07-20

## 2018-07-20 VITALS
SYSTOLIC BLOOD PRESSURE: 121 MMHG | OXYGEN SATURATION: 96 % | DIASTOLIC BLOOD PRESSURE: 78 MMHG | BODY MASS INDEX: 31.07 KG/M2 | HEIGHT: 68 IN | HEART RATE: 79 BPM | RESPIRATION RATE: 18 BRPM | TEMPERATURE: 97.9 F | WEIGHT: 205 LBS

## 2018-07-20 DIAGNOSIS — E07.9 THYROID DISEASE: ICD-10-CM

## 2018-07-20 DIAGNOSIS — E03.1 CONGENITAL HYPOTHYROIDISM WITHOUT GOITER: Chronic | ICD-10-CM

## 2018-07-20 DIAGNOSIS — R97.20 ELEVATED PSA: Primary | ICD-10-CM

## 2018-07-20 RX ORDER — LEVOTHYROXINE SODIUM 150 UG/1
150 TABLET ORAL
Qty: 30 TAB | Refills: 3 | Status: SHIPPED | OUTPATIENT
Start: 2018-07-20 | End: 2018-08-20 | Stop reason: SDUPTHER

## 2018-07-20 NOTE — PROGRESS NOTES
SUBJECTIVE:   Mr. Ania Lobato is a 80 y.o. male who is here for follow up of routine medical issues. Pt's PSA on 7/18/18 was elevated at 12.9. Pt c/o urinary frequency, urgency, and nocturia. Pt's TSH on 7/18/18 was elevated at 10.030. Pt c/o fatigue. He states he is not compliant taking his levothyroxine. Pt inquired about weight loss strategies. Pt's A1c on 7/18/18 was 5.7. Pt reports seeing Dr. Fiona Byrnes (cardiology) on 6/25/18 and states  wants a stress test, but the pt needs the care card/access card first.     At this time, he is otherwise doing well and has brought no other complaints to my attention today. For a list of the medical issues addressed today, see the assessment and plan below. PMH:   Past Medical History:   Diagnosis Date    HTN (hypertension)     Hypercholesterolemia     [facility]I [facility]non-ST elevated myocardial infarction) ([facility]     Other ill-defined conditions(799.89)     thyroid, high cholesterol    Thyroid disease     Troponin I above reference range      PSH:  has a past surgical history that includes hx heent. All: has No Known Allergies. MEDS:   Current Outpatient Prescriptions   Medication Sig    levothyroxine (SYNTHROID) 150 mcg tablet Take 1 Tab by mouth Daily (before breakfast).  metoprolol succinate (TOPROL-XL) 25 mg XL tablet TAKE 1/2 (ONE-HALF) TABLET BY MOUTH ONCE DAILY    atorvastatin (LIPITOR) 40 mg tablet TAKE ONE TABLET BY MOUTH ONCE DAILY    lisinopril (PRINIVIL, ZESTRIL) 5 mg tablet TAKE ONE TABLET BY MOUTH ONCE DAILY    nitroglycerin (NITROSTAT) 0.4 mg SL tablet 1 Tab by SubLINGual route every five (5) minutes as needed for Chest Pain (call 911 if not relieved by 3).  aspirin 81 mg chewable tablet Take 2 Tabs by mouth daily. No current facility-administered medications for this visit. FH: family history includes Heart Disease in his father; Hypertension in his mother; Stroke in his mother.    SH:  reports that he quit smoking about 28 years ago. He quit after 20.00 years of use. He has never used smokeless tobacco. He reports that he drinks alcohol. He reports that he does not use illicit drugs. Review of Systems - History obtained from the patient  General ROS: fatigue no fever, chills, body aches  Psychological ROS: no change in anxiety, depression, SI/HI  Ophthalmic ROS: no blurred vision, myopia, double vision  ENT ROS: no dysphagia, otalgia, otorrhea, rhinorrhea, post nasal drip  Respiratory ROS: no cough, shortness of breath, or wheezing  Cardiovascular ROS: no chest pain or dyspnea on exertion  Gastrointestinal ROS: no abdominal pain, change in bowel habits, or black or bloody stools  Genito-Urinary ROS:  frequency, urgency, nocturia no incontinence, dysuria, hematouria  Musculoskeletal ROS: no arthralagia, myalgia  Neurological ROS: no headaches, dizziness, lightheadedness, tremors, seizures  Dermatological ROS: no rash or lesions    OBJECTIVE:   Vitals:   Visit Vitals    /78 (BP 1 Location: Left arm, BP Patient Position: Sitting)    Pulse 79    Temp 97.9 °F (36.6 °C) (Oral)    Resp 18    Ht 5' 8\" (1.727 m)    Wt 205 lb (93 kg)    SpO2 96%    BMI 31.17 kg/m2      Gen: Pleasant 80 y.o.  male in NAD. HEENT: PERRLA. EOMI. OP moist and pink. Neck: Supple. No LAD. HEART: RRR, No M/G/R.      LUNGS: CTAB No W/R. ABDOMEN: S, NT, ND, BS+. EXTREMITIES: Warm. No C/C/E.    MUSCULOSKELETAL: Normal ROM, muscle strength 5/5 all groups. NEURO: Alert and oriented x 3. Cranial nerves grossly intact. No focal sensory or motor deficits noted. SKIN: Warm. Dry. No rashes or other lesions noted. ASSESSMENT/ PLAN: Diagnoses and all orders for this visit:    1. Elevated PSA  -     REFERRAL TO UROLOGY    2. Congenital hypothyroidism without goiter  -     levothyroxine (SYNTHROID) 150 mcg tablet; Take 1 Tab by mouth Daily (before breakfast).     3. Thyroid disease  -     levothyroxine (SYNTHROID) 150 mcg tablet; Take 1 Tab by mouth Daily (before breakfast). ICD-10-CM ICD-9-CM    1. Elevated PSA R97.20 790.93 REFERRAL TO UROLOGY   2. Congenital hypothyroidism without goiter E03.1 243 levothyroxine (SYNTHROID) 150 mcg tablet   3. Thyroid disease E07.9 246.9 levothyroxine (SYNTHROID) 150 mcg tablet      1. Elevated PSA  I referred pt to urology again for further assessment. 2. Hypothyroidism  I recommended pt continue at current dose of levothyroxine and emphasized it needs to be taken every day (refill given). 3. Thyroid disease  Refer to #2. I encouraged pt to apply for the Care Card and the [facility] Access Card to ensure coverage for a care team and procedures. I advised pt to monitor his carbohydrate intake and portion sizes. Follow-up Disposition:  Return in about 3 months (around 10/20/2018) for follow up elevated A1c. I have reviewed the patient's medications and risks/side effects/benefits were discussed. Diagnosis(-es) explained to patient and questions answered. Literature provided where appropriate.      Written by Loki Barrera, as dictated by Kristina Perez MD.

## 2018-07-20 NOTE — MR AVS SNAPSHOT
[address] [facility] 
[phone] Patient: Bethel Hernandez 
MRN: [id number] Visit Information Date & Time Provider Department Dept. Phone Encounter #  
 7/20/2018 11:15 AM Hung Fraser, [address] [phone] [id number] Follow-up Instructions Return in about 3 months (around 10/20/2018) for follow up elevated A1c. Upcoming Health Maintenance Date Due Hepatitis C Screening 1950 DTaP/Tdap/Td series (1 - Tdap) 4/14/1972 FOBT Q 1 YEAR AGE 50-75 4/14/2001 ZOSTER VACCINE AGE 60> 2/14/2011 GLAUCOMA SCREENING Q2Y 4/14/2016 Pneumococcal 65+ Low/Medium Risk (1 of 2 - PCV13) 4/14/2016 Influenza Age 5 to Adult 8/1/2018 Allergies as of 7/20/2018  Review Complete On: 7/20/2018 By: Sophia Preston LPN No Known Allergies Current Immunizations  Reviewed on 6/12/2012 No immunizations on file. Not reviewed this visit You Were Diagnosed With   
  
 Codes Comments Elevated PSA    -  Primary ICD-10-CM: R97.20 ICD-9-CM: 790.93 Congenital hypothyroidism without goiter     ICD-10-CM: E03.1 ICD-9-CM: 448 Thyroid disease     ICD-10-CM: E07.9 ICD-9-CM: 246. 9 Vitals BP Pulse Temp Resp Height(growth percentile) Weight(growth percentile) 121/78 (BP 1 Location: Left arm, BP Patient Position: Sitting) 79 97.9 °F (36.6 °C) (Oral) 18 5' 8\" (1.727 m) 205 lb (93 kg) SpO2 BMI Smoking Status 96% 31.17 kg/m2 Former Smoker Vitals History BMI and BSA Data Body Mass Index Body Surface Area  
 31.17 kg/m 2 2.11 m 2 Preferred Pharmacy Pharmacy Name Phone [facility] [address], [address] [phone] Your Updated Medication List  
  
   
This list is accurate as of 7/20/18 12:05 PM.  Always use your most recent med list.  
  
  
  
  
 aspirin 81 mg chewable tablet Take 2 Tabs by mouth daily. atorvastatin 40 mg tablet Commonly known as:  LIPITOR  
TAKE ONE TABLET BY MOUTH ONCE DAILY  
  
 levothyroxine 150 mcg tablet Commonly known as:  SYNTHROID Take 1 Tab by mouth Daily (before breakfast). lisinopril 5 mg tablet Commonly known as:  PRINIVIL, ZESTRIL  
TAKE ONE TABLET BY MOUTH ONCE DAILY  
  
 metoprolol succinate 25 mg XL tablet Commonly known as:  TOPROL-XL  
TAKE 1/2 (ONE-HALF) TABLET BY MOUTH ONCE DAILY  
  
 nitroglycerin 0.4 mg SL tablet Commonly known as:  NITROSTAT  
1 Tab by SubLINGual route every five (5) minutes as needed for Chest Pain (call 911 if not relieved by 3). Prescriptions Sent to Pharmacy Refills  
 levothyroxine (SYNTHROID) 150 mcg tablet 3 Sig: Take 1 Tab by mouth Daily (before breakfast). Class: Normal  
 Pharmacy: [facility] [address] FADI HEAD [address], [address] [place] [address] #: [phone] Route: Oral  
  
We Performed the Following REFERRAL TO UROLOGY [VHY735 Custom] Follow-up Instructions Return in about 3 months (around 10/20/2018) for follow up elevated A1c. Referral Information Referral ID Referred By Referred To  
  
 [id number] [facility] . [address]   
   [place], [address] Visits Status Start Date End Date 1 New Request 7/20/18 7/20/19 If your referral has a status of pending review or denied, additional information will be sent to support the outcome of this decision. Introducing [facility] & HE[facility]! Dear Ivan Mane: 
Thank you for requesting a Heyday account. Our records indicate that you already have an active Heyday account. You can access your account anytime at [email or web address] Did you know that you can access your hospital and ER discharge instructions at any time in Heyday?   You can also review all of your test results from your hospital stay or ER visit. Additional Information If you have questions, please visit the Frequently Asked Questions section of the Octro website at [email or web address] Remember, Octro is NOT to be used for urgent needs. For medical emergencies, dial 911. Now available from your iPhone and Android! Please provide this summary of care documentation to your next provider. Your primary care clinician is listed as Rodney Barba. If you have any questions after today's visit, please call [phone].

## 2018-07-23 ENCOUNTER — TELEPHONE (OUTPATIENT)
Dept: INTERNAL MEDICINE CLINIC | Age: 68
End: 2018-07-23

## 2018-07-23 NOTE — TELEPHONE ENCOUNTER
----- Message from Leonor Bee MD sent at 7/19/2018  1:41 PM EDT -----  This patient will need to see endo crinology and urology. Does he have the Care Card or [facility] card?

## 2018-07-23 NOTE — TELEPHONE ENCOUNTER
Identified patient 2 identifiers verified. Patient was given the number to [facility] and labs reviewed with patient.

## 2018-07-24 NOTE — TELEPHONE ENCOUNTER
Did he make an appointment with the urologist. He can transfer to [facility] if he needs a procedure after his initial urology evaluation.

## 2018-07-24 NOTE — PROGRESS NOTES
Cholesterol is a little high on high intensity statin medication. Work hard on diet and exercise to try to reduce cholesterol further. Will need to be reassessed in the next 6-12 months.

## 2018-07-25 ENCOUNTER — TELEPHONE (OUTPATIENT)
Dept: CARDIOLOGY CLINIC | Age: 68
End: 2018-07-25

## 2018-07-25 ENCOUNTER — TELEPHONE (OUTPATIENT)
Dept: INTERNAL MEDICINE CLINIC | Age: 68
End: 2018-07-25

## 2018-07-25 NOTE — TELEPHONE ENCOUNTER
----- Message from Kal Smyth MD sent at 7/24/2018  3:52 PM EDT -----  Cholesterol is a little high on high intensity statin medication. Work hard on diet and exercise to try to reduce cholesterol further. Will need to be reassessed in the next 6-12 months.

## 2018-07-25 NOTE — TELEPHONE ENCOUNTER
Call attempted to patient, there was no answer. Left a voice mail message asking if he was able to secure an appointment with a Urologist. Return call requested.

## 2018-08-20 DIAGNOSIS — E03.1 CONGENITAL HYPOTHYROIDISM WITHOUT GOITER: Chronic | ICD-10-CM

## 2018-08-20 DIAGNOSIS — E07.9 THYROID DISEASE: ICD-10-CM

## 2018-08-22 RX ORDER — LEVOTHYROXINE SODIUM 150 UG/1
150 TABLET ORAL
Qty: 30 TAB | Refills: 3 | Status: SHIPPED | OUTPATIENT
Start: 2018-08-22 | End: 2019-07-21 | Stop reason: SDUPTHER

## 2018-08-22 NOTE — TELEPHONE ENCOUNTER
From: Gus Tai Mast  To: Harley Brown MD  Sent: 8/20/2018 4:14 PM EDT  Subject: Medication Renewal Request    Original authorizing provider: Harley Brown MD    Y[facility]ie [address] would like a refill of the following medications:  levothyroxine (SYNTHROID) 150 mcg tablet Harley Brown MD]    Preferred pharmacy: [address] [place] [address] [address] [place] [address]    Comment:  Please refill levothyroxine for 90 day supplies. Thanks!

## 2018-08-22 NOTE — TELEPHONE ENCOUNTER
PCP: Clemencia Hanna MD    Last appt: 7/20/2018  No future appointments. Requested Prescriptions     Pending Prescriptions Disp Refills    levothyroxine (SYNTHROID) 150 mcg tablet 30 Tab 3     Sig: Take 1 Tab by mouth Daily (before breakfast).

## 2018-10-22 ENCOUNTER — TELEPHONE (OUTPATIENT)
Dept: CARDIOLOGY CLINIC | Age: 68
End: 2018-10-22

## 2018-10-22 NOTE — TELEPHONE ENCOUNTER
Dr Estefania Sepulveda requesting cardiac clearance for prostate biopsy with ultrasound under local anesthesia and holding blood thinner Last visit 6-25-18 .  Please advise thanks

## 2018-10-22 NOTE — TELEPHONE ENCOUNTER
Low cardiac risk to proceed with prostate biopsy and may hold aspirin for 5-7 days prior. Restart when felt to be safe from a surgical standpoint.

## 2018-11-05 RX ORDER — METOPROLOL SUCCINATE 25 MG/1
TABLET, EXTENDED RELEASE ORAL
Qty: 45 TAB | Refills: 0 | Status: SHIPPED | OUTPATIENT
Start: 2018-11-05 | End: 2019-03-14 | Stop reason: SDUPTHER

## 2019-03-01 RX ORDER — ATORVASTATIN CALCIUM 40 MG/1
TABLET, FILM COATED ORAL
Qty: 90 TAB | Refills: 0 | Status: SHIPPED | OUTPATIENT
Start: 2019-03-01 | End: 2019-07-17 | Stop reason: SDUPTHER

## 2019-03-15 RX ORDER — METOPROLOL SUCCINATE 25 MG/1
TABLET, EXTENDED RELEASE ORAL
Qty: 45 TAB | Refills: 0 | Status: SHIPPED | OUTPATIENT
Start: 2019-03-15 | End: 2019-07-20 | Stop reason: SDUPTHER

## 2019-06-12 ENCOUNTER — OFFICE VISIT (OUTPATIENT)
Dept: INTERNAL MEDICINE CLINIC | Age: 69
End: 2019-06-12

## 2019-06-12 VITALS
DIASTOLIC BLOOD PRESSURE: 68 MMHG | BODY MASS INDEX: 30.81 KG/M2 | SYSTOLIC BLOOD PRESSURE: 119 MMHG | RESPIRATION RATE: 16 BRPM | WEIGHT: 208 LBS | TEMPERATURE: 97.8 F | OXYGEN SATURATION: 95 % | HEART RATE: 75 BPM | HEIGHT: 69 IN

## 2019-06-12 DIAGNOSIS — E03.9 ACQUIRED HYPOTHYROIDISM: ICD-10-CM

## 2019-06-12 DIAGNOSIS — R97.20 ELEVATED PSA: Primary | ICD-10-CM

## 2019-06-12 DIAGNOSIS — R42 DIZZINESS: ICD-10-CM

## 2019-06-12 DIAGNOSIS — R73.09 ELEVATED HEMOGLOBIN A1C: ICD-10-CM

## 2019-06-12 RX ORDER — MECLIZINE HCL 12.5 MG 12.5 MG/1
12.5 TABLET ORAL
Qty: 30 TAB | Refills: 1 | Status: SHIPPED | OUTPATIENT
Start: 2019-06-12 | End: 2019-06-22

## 2019-06-12 NOTE — PATIENT INSTRUCTIONS
Office Policies    Phone calls/patient messages:            Please allow up to 24 hours for someone in the office to contact you about your call or message. Be mindful your provider may be out of the office or your message may require further review. We encourage you to use [employer] for your messages as this is a faster, more efficient way to communicate with our office                         Medication Refills:            Prescription medications require 48-72 business hours to process. We encourage you to use [employer] for your refills. For controlled medications: Please allow 72 business hours to process. Certain medications may require you to  a written prescription at our office. NO narcotic/controlled medications will be prescribed after 4pm Monday through Friday or on weekends              Form/Paperwork Completion:            Please note a $25 fee may incur for all paperwork for completed by our providers. We ask that you allow 7-10 business days. Pre-payment is due prior to picking up/faxing the completed form. You may also download your forms to [employer] to have your doctor print off.

## 2019-06-12 NOTE — PROGRESS NOTES
SUBJECTIVE:   Mr. Cheryl Blake is a 77 y.o. male who is here for follow up of routine medical issues. Pt c/o dizziness, fatigue, occasionally productive cough, rhinorrhea x 2 mos. Pt states he feels lightheaded with the dizziness. Pt acknowledges poor hydration. Pt acknowledges he does not consistently take levothyroxine. Pt's 7/2018 TSH was elevated (10.030) and T4 was normal. Pt is unsure if he snores. Pt notes hx of walking pneumonia with similar onset of sxs. Pt denies chest pain, SOB, changes in bowel habits, black or bloody stool, hematuria, tinnitus, F/C, body aches. Pt was referred to Dr. Michelle Renae (urology) in 7/2018 for elevated PSA (12.9). Pt has not followed up since the initial encounter due to the cost of biopsy. Pt has not followed up with Dr. Herberth Gomez (cardiology) for a stress test due to cost.    Pt has not set up the Care Card or [facility] Access Card. He is eligible for Medicare, but has not applied. HTN: Pt's BP in the office today was 119/68. Pt is compliant in taking metoprolol succinate and lisinopril. Patient denies chest pain, POWELL/SOB, edema, headache, visual changes, palpitations or syncope. HLD: Pt is compliant in taking atorvastatin. Patient denies abdominal pain, nausea, vomiting, diarrhea, arthralgias/myalgias due to the medication. Pt's 7/2018 lipid panel revealed elevated LDL (120), but was otherwise normal.     At this time, he is otherwise doing well and has brought no other complaints to my attention today. For a list of the medical issues addressed today, see the assessment and plan below. PMH:   Past Medical History:   Diagnosis Date    HTN (hypertension)     Hypercholesterolemia     [facility]STEMI (non-ST elevated myocardial infarction) ([facility] [address]     Other ill-defined conditions(799.89)     thyroid, high cholesterol    Thyroid disease     Troponin I above reference range      PSH:  has a past surgical history that includes hx heent. All: has No Known Allergies. MEDS:   Current Outpatient Medications   Medication Sig    meclizine (ANTIVERT) 12.5 mg tablet Take 1 Tab by mouth three (3) times daily as needed for Dizziness for up to 10 days.  metoprolol succinate (TOPROL-XL) 25 mg XL tablet TAKE 1/2 (ONE-HALF) TABLET BY MOUTH ONCE DAILY    atorvastatin (LIPITOR) 40 mg tablet TAKE 1 TABLET BY MOUTH ONCE DAILY    lisinopril (PRINIVIL, ZESTRIL) 5 mg tablet TAKE 1 TABLET BY MOUTH ONCE DAILY    levothyroxine (SYNTHROID) 150 mcg tablet Take 1 Tab by mouth Daily (before breakfast).  nitroglycerin (NITROSTAT) 0.4 mg SL tablet 1 Tab by SubLINGual route every five (5) minutes as needed for Chest Pain (call 911 if not relieved by 3).  aspirin 81 mg chewable tablet Take 2 Tabs by mouth daily. No current facility-administered medications for this visit. FH: family history includes Heart Disease in his father; Hypertension in his mother; Stroke in his mother. SH:  reports that he quit smoking about 29 years ago. He quit after 20.00 years of use. He has never used smokeless tobacco. He reports that he drinks alcohol. He reports that he does not use drugs.      Review of Systems - History obtained from the patient  General ROS: fatigue, no fever, chills, body aches  Psychological ROS: no change in anxiety, depression, SI/HI  Ophthalmic ROS: no blurred vision, myopia, double vision  ENT ROS: rhinorrhea, no dysphagia, otalgia, otorrhea,  post nasal drip  Respiratory ROS: cough, no shortness of breath, or wheezing  Cardiovascular ROS: no chest pain or dyspnea on exertion  Gastrointestinal ROS: no abdominal pain, change in bowel habits, or black or bloody stools  Genito-Urinary ROS: no frequency, urgency, incontinence, dysuria, hematouria  Musculoskeletal ROS: no arthralagia, myalgia  Neurological ROS: dizziness, no headaches, lightheadedness, tremors, seizures  Dermatological ROS: no rash or lesions    OBJECTIVE:   Vitals:   Visit Vitals  /68 (BP 1 Location: Left arm, BP Patient Position: Sitting)   Pulse 75   Temp 97.8 °F (36.6 °C) (Oral)   Resp 16   Ht 5' 8.5\" (1.74 m)   Wt 208 lb (94.3 kg)   SpO2 95%   BMI 31.17 kg/m²      Gen: Pleasant 77 y.o.  male in NAD. HEENT: PERRLA. EOMI. OP moist and pink. TMs normal and canals clear bilaterally. Neck: Supple. No LAD. No carotid bruits. HEART: RRR, No M/G/R.      LUNGS: CTAB No W/R. EXTREMITIES: Warm. No C/C/E.    MUSCULOSKELETAL: Normal ROM, muscle strength 5/5 all groups. NEURO: Alert and oriented x 3. Cranial nerves grossly intact. No focal sensory or motor deficits noted. SKIN: Warm. Dry. No rashes or other lesions noted. ASSESSMENT/ PLAN: Diagnoses and all orders for this visit:    1. Elevated PSA  -     PSA W/ REFLX FREE PSA    2. Elevated hemoglobin A1c  -     HEMOGLOBIN A1C WITH EAG  -     METABOLIC PANEL, COMPREHENSIVE    3. Acquired hypothyroidism  -     TSH 3RD GENERATION  -     T4, FREE    4. Dizziness  -     meclizine (ANTIVERT) 12.5 mg tablet; Take 1 Tab by mouth three (3) times daily as needed for Dizziness for up to 10 days. ICD-10-CM ICD-9-CM    1. Elevated PSA R97.20 790.93 PSA W/ REFLX FREE PSA   2. Elevated hemoglobin A1c R73.09 790.29 HEMOGLOBIN A1C WITH EAG      METABOLIC PANEL, COMPREHENSIVE   3. Acquired hypothyroidism E03.9 244.9 TSH 3RD GENERATION      T4, FREE   4. Dizziness R42 780.4 meclizine (ANTIVERT) 12.5 mg tablet      1. Elevated PSA  Pt is due for a repeat PSA. I advised pt to follow up with Dr. Jil Stout (urology) when he has insurance coverage for proper evaluation. 2. Elevated hemoglobin A1c  I advised pt to avoid sugars and starches and increase exercise. Recheck A1c, CMP. 3. Hypothyroidism  Pt has not been able to take his levothyroxine consistently. He is likely hypothyroid, contributing to his fatigue. I discussed the importance of compliance. Recheck T4 and TSH. 4. Dizziness  I prescribed meclizine to help abort dizziness.     I advised pt to apply for the Access Card and Care Card or Medicare for insurance coverage. Follow-up and Dispositions    · Return in about 3 months (around 9/12/2019) for follow up. I have reviewed the patient's medications and risks/side effects/benefits were discussed. Diagnosis(-es) explained to patient and questions answered. Literature provided where appropriate.      Written by Belen Tapia, as dictated by Tree Denney MD.

## 2019-06-13 LAB
ALBUMIN SERPL-MCNC: 4.3 G/DL (ref 3.6–4.8)
ALBUMIN/GLOB SERPL: 1.8 {RATIO} (ref 1.2–2.2)
ALP SERPL-CCNC: 62 IU/L (ref 39–117)
ALT SERPL-CCNC: 21 IU/L (ref 0–44)
AST SERPL-CCNC: 22 IU/L (ref 0–40)
BILIRUB SERPL-MCNC: 1.6 MG/DL (ref 0–1.2)
BUN SERPL-MCNC: 17 MG/DL (ref 8–27)
BUN/CREAT SERPL: 13 (ref 10–24)
CALCIUM SERPL-MCNC: 9 MG/DL (ref 8.6–10.2)
CHLORIDE SERPL-SCNC: 106 MMOL/L (ref 96–106)
CO2 SERPL-SCNC: 20 MMOL/L (ref 20–29)
CREAT SERPL-MCNC: 1.27 MG/DL (ref 0.76–1.27)
EST. AVERAGE GLUCOSE BLD GHB EST-MCNC: 114 MG/DL
GLOBULIN SER CALC-MCNC: 2.4 G/DL (ref 1.5–4.5)
GLUCOSE SERPL-MCNC: 77 MG/DL (ref 65–99)
HBA1C MFR BLD: 5.6 % (ref 4.8–5.6)
POTASSIUM SERPL-SCNC: 4.3 MMOL/L (ref 3.5–5.2)
PROT SERPL-MCNC: 6.7 G/DL (ref 6–8.5)
PSA SERPL-MCNC: 13.5 NG/ML (ref 0–4)
REFLEX CRITERIA: ABNORMAL
SODIUM SERPL-SCNC: 140 MMOL/L (ref 134–144)
T4 FREE SERPL-MCNC: 1.5 NG/DL (ref 0.82–1.77)
TSH SERPL DL<=0.005 MIU/L-ACNC: 2.89 UIU/ML (ref 0.45–4.5)

## 2019-06-19 NOTE — PROGRESS NOTES
PSA- higher   He will need to see his urologist. Has he applied for Medicare? CMP-Normal electrolyte levels, renal, and liver function. Thyroid function-normal  CMP - normal except for an elevated bilirubin.   A1c-Normal

## 2019-06-20 NOTE — PROGRESS NOTES
Message was left for patient that lab letter has been mailed and he can contact this office for questions and concerns.

## 2019-06-26 RX ORDER — LISINOPRIL 5 MG/1
TABLET ORAL
Qty: 90 TAB | Refills: 0 | Status: SHIPPED | OUTPATIENT
Start: 2019-06-26 | End: 2019-10-04 | Stop reason: SDUPTHER

## 2019-06-27 RX ORDER — ATORVASTATIN CALCIUM 40 MG/1
TABLET, FILM COATED ORAL
Qty: 90 TAB | Refills: 0 | OUTPATIENT
Start: 2019-06-27

## 2019-07-17 RX ORDER — ATORVASTATIN CALCIUM 40 MG/1
TABLET, FILM COATED ORAL
Qty: 90 TAB | Refills: 0 | Status: SHIPPED | OUTPATIENT
Start: 2019-07-17 | End: 2022-05-18 | Stop reason: SDUPTHER

## 2019-07-19 ENCOUNTER — OFFICE VISIT (OUTPATIENT)
Dept: CARDIOLOGY CLINIC | Age: 69
End: 2019-07-19

## 2019-07-19 VITALS
BODY MASS INDEX: 32.12 KG/M2 | WEIGHT: 211.9 LBS | RESPIRATION RATE: 18 BRPM | SYSTOLIC BLOOD PRESSURE: 116 MMHG | OXYGEN SATURATION: 95 % | HEART RATE: 76 BPM | HEIGHT: 68 IN | DIASTOLIC BLOOD PRESSURE: 82 MMHG

## 2019-07-19 DIAGNOSIS — E78.2 MIXED HYPERLIPIDEMIA: ICD-10-CM

## 2019-07-19 DIAGNOSIS — I10 HYPERTENSION, UNSPECIFIED TYPE: ICD-10-CM

## 2019-07-19 DIAGNOSIS — I25.10 ASHD (ARTERIOSCLEROTIC HEART DISEASE): Primary | ICD-10-CM

## 2019-07-19 DIAGNOSIS — Z98.61 HISTORY OF PTCA: ICD-10-CM

## 2019-07-19 NOTE — PROGRESS NOTES
[address], [address] [facility]  [phone]     Subjective:      oJse Alfaro is a 77 y.o. male is here for routine f/u. Reports feeling abnormally tired \"not having energy like I used to\". He had been previously told that he snores, and admits to daytime sleepiness. He also reports unchanged pop, still cutting trees / heavy yardwork. Also occasional dizziness, admits to not staying well hydrated. PCP gave him a prescription for Meclizine, he doesn't know what he did with it. The patient denies chest pain, orthopnea, PND, LE edema, palpitations, syncope, or presyncope.        Patient Active Problem List    Diagnosis Date Noted    Coronary artery disease involving native coronary artery of native heart 03/12/2017    History of PTCA 03/12/2017    Routine general medical examination at a health care facility 03/07/2017    Hypercholesterolemia     Thyroid disease     NSTEMI (non-ST elevated myocardial infarction) ([facility] [address] 06/12/2012    HTN (hypertension) 06/12/2012    Troponin level elevated 06/12/2012    Hypothyroidism 06/12/2012      Sandeep Carter MD  Past Medical History:   Diagnosis Date    HTN (hypertension)     Hypercholesterolemia     NSTEMI (non-ST elevated myocardial infarction) ([address]     Other ill-defined conditions(799.89)     thyroid, high cholesterol    Thyroid disease     Troponin I above reference range       Past Surgical History:   Procedure Laterality Date    HX HEENT      tonsils     No Known Allergies   Family History   Problem Relation Age of Onset    Stroke Mother     Hypertension Mother     Heart Disease Father       Social History     Socioeconomic History    Marital status: SINGLE     Spouse name: Not on file    Number of children: Not on file    Years of education: Not on file    Highest education level: Not on file   Occupational History    Not on file   Social Needs    Financial resource strain: Not on file   [place] insecurity:     Worry: Not on file     Inability: Not on file    Transportation needs:     Medical: Not on file     Non-medical: Not on file   Tobacco Use    Smoking status: Former Smoker     Years: 20.00     Types: Cigarettes     Last attempt to quit: 1990     Years since quittin.5    Smokeless tobacco: Never Used   Substance and Sexual Activity    Alcohol use: Yes     Frequency: 2-3 times a week     Drinks per session: 1 or 2    Drug use: Not Currently     Types: Cocaine     Comment: quit in     Sexual activity: Not Currently     Partners: Female   Lifestyle    Physical activity:     Days per week: Not on file     Minutes per session: Not on file    Stress: Not on file   Relationships    Social connections:     Talks on phone: Not on file     Gets together: Not on file     Attends Oriental orthodox service: Not on file     Active member of club or organization: Not on file     Attends meetings of clubs or organizations: Not on file     Relationship status: Not on file    Intimate partner violence:     Fear of current or ex partner: Not on file     Emotionally abused: Not on file     Physically abused: Not on file     Forced sexual activity: Not on file   Other Topics Concern    Not on file   Social History Narrative    Not on file      Current Outpatient Medications   Medication Sig    atorvastatin (LIPITOR) 40 mg tablet TAKE 1 TABLET BY MOUTH ONCE DAILY    lisinopril (PRINIVIL, ZESTRIL) 5 mg tablet TAKE 1 TABLET BY MOUTH ONCE DAILY    metoprolol succinate (TOPROL-XL) 25 mg XL tablet TAKE 1/2 (ONE-HALF) TABLET BY MOUTH ONCE DAILY    levothyroxine (SYNTHROID) 150 mcg tablet Take 1 Tab by mouth Daily (before breakfast).  nitroglycerin (NITROSTAT) 0.4 mg SL tablet 1 Tab by SubLINGual route every five (5) minutes as needed for Chest Pain (call 911 if not relieved by 3).  aspirin 81 mg chewable tablet Take 2 Tabs by mouth daily. No current facility-administered medications for this visit. Review of Symptoms:  11 systems reviewed, negative other than as stated in the HPI    Physical ExamPhysical Exam:    Vitals:    07/19/19 0902 07/19/19 0922 07/19/19 0924   BP: 120/74 120/82 116/82   Pulse: 66 76 76   Resp: 18     SpO2: 94% 93% 95%   Weight: 211 lb 14.4 oz (96.1 kg)     Height: 5' 8\" (1.727 m)       Body mass index is 32.22 kg/m². General PE  Gen:  NAD  Mental Status - Alert. General Appearance - Not in acute distress. HEENT:  PERRL, no carotid bruits or JVD  Chest and Lung Exam   Inspection: Accessory muscles - No use of accessory muscles in breathing. Auscultation:   Breath sounds: - Normal.   Cardiovascular   Inspection: Jugular vein - Bilateral - Inspection Normal.   Palpation/Percussion:   Apical Impulse: - Normal.   Auscultation: Rhythm - Regular. Heart Sounds - S1 WNL and S2 WNL. No S3 or S4. Murmurs & Other Heart Sounds: Auscultation of the heart reveals - No Murmurs. Peripheral Vascular   Upper Extremity: Inspection - Bilateral - No Cyanotic nailbeds or Digital clubbing. Lower Extremity:   Palpation: Edema - Bilateral - No edema. Abdomen:   Soft, non-tender, bowel sounds are active.   Neuro: A&O times 3, CN and motor grossly WNL    Labs:   Lab Results   Component Value Date/Time    Cholesterol, total 180 07/18/2018 09:35 AM    Cholesterol, total 136 03/09/2017 11:09 AM    Cholesterol, total 160 08/17/2015 09:41 AM    Cholesterol, total 178 05/29/2014 10:38 AM    Cholesterol, total 175 06/07/2013 10:10 AM    HDL Cholesterol 42 07/18/2018 09:35 AM    HDL Cholesterol 39 (L) 03/09/2017 11:09 AM    HDL Cholesterol 50 08/17/2015 09:41 AM    HDL Cholesterol 44 05/29/2014 10:38 AM    HDL Cholesterol 40 06/07/2013 10:10 AM    LDL,Direct 131 (H) 05/29/2014 10:38 AM    LDL, calculated 120 (H) 07/18/2018 09:35 AM    LDL, calculated 77 03/09/2017 11:09 AM    LDL, calculated 93 08/17/2015 09:41 AM    LDL, calculated 110 (H) 05/29/2014 10:38 AM    LDL, calculated 119 (H) 06/07/2013 10:10 AM    Triglyceride 88 07/18/2018 09:35 AM    Triglyceride 99 03/09/2017 11:09 AM    Triglyceride 84 08/17/2015 09:41 AM    Triglyceride 121 05/29/2014 10:38 AM    Triglyceride 81 06/07/2013 10:10 AM    CHOL/HDL Ratio 5.4 (H) 06/13/2012 04:45 AM    CHOL/HDL Ratio 5.8 (H) 03/16/2010 09:35 AM    CHOL/HDL Ratio 5.4 (H) 06/19/2009 10:45 AM     Lab Results   Component Value Date/Time    CK 1050 (H) 06/13/2012 04:45 AM     Lab Results   Component Value Date/Time    Sodium 140 06/12/2019 02:24 PM    Potassium 4.3 06/12/2019 02:24 PM    Chloride 106 06/12/2019 02:24 PM    CO2 20 06/12/2019 02:24 PM    Anion gap 6 06/14/2012 05:00 AM    Glucose 77 06/12/2019 02:24 PM    BUN 17 06/12/2019 02:24 PM    Creatinine 1.27 06/12/2019 02:24 PM    BUN/Creatinine ratio 13 06/12/2019 02:24 PM    GFR est AA 67 06/12/2019 02:24 PM    GFR est non-AA 58 (L) 06/12/2019 02:24 PM    Calcium 9.0 06/12/2019 02:24 PM    Bilirubin, total 1.6 (H) 06/12/2019 02:24 PM    AST (SGOT) 22 06/12/2019 02:24 PM    Alk. phosphatase 62 06/12/2019 02:24 PM    Protein, total 6.7 06/12/2019 02:24 PM    Albumin 4.3 06/12/2019 02:24 PM    Globulin 3.8 06/13/2012 04:45 AM    A-G Ratio 1.8 06/12/2019 02:24 PM    ALT (SGPT) 21 06/12/2019 02:24 PM       EKG:  NSR      Assessment:     Assessment:      1. ASHD (arteriosclerotic heart disease)    2. Hypertension, unspecified type    3. Mixed hyperlipidemia    4. History of PTCA        Orders Placed This Encounter    CK    METABOLIC PANEL, COMPREHENSIVE    LIPID PANEL    AMB POC EKG ROUTINE W/ 12 LEADS, INTER & REP     Order Specific Question:   Reason for Exam:     Answer:   routine        Plan:     Patient presents for f/u, doing well and stable from cardiac standpoint. Reports feeling abnormally tired \"not having energy like I used to\". He had been previously told that he snores, and admits to daytime sleepiness. He also reports unchanged mild pop, still cutting trees / heavy yardwork.   Also occasional dizziness, admits to not staying well hydrated. PCP gave him a prescription for Meclizine, he doesn't know what he did with it. He defers any work up because he has no insurance. He's eligible for Medicare but has not applied. ASHD  6/13/13--PTCA/BMS to OM1, PTCA/BMS to mid RCA   Stress test cardiolyte without evidence of ischemia in 7/14  On ASA, BB, statin    Fatigue  Defers stress test, sleep study due to financial concerns at this time. Encourage the patient to apply for Medicare ASAP, limit strenuous activities, follow-up in 4 months, if still having symptoms at that time proceed with stress test and sleep consultation after he has been approved for Medicare.     HTN  Controlled with current therapy     Hx normal EF, mild MR per echo in 7/13     HLD  7/18 LDL at 120. On statin. Will check labs. Advised that we can refill statin for a year once we see lab results.     Counseled on diet and exercise- eventual goal of 30-60 minutes 5-7 times a week as per AHA guidelines. He is quite active, doing heavy labor at work.       Continue current care and f/u in 1 yr      Yanci Echevarria MD

## 2019-07-19 NOTE — LETTER
7/19/19 Patient: Isis Pena  
YOB: 1950 Date of Visit: 7/19/2019 Froy Dennis MD 
[address] [address] VIA In Basket Dear Froy Dennis MD, Thank you for referring Mr. Joselin Betts to [address] [place] [address] for evaluation. My notes for this consultation are attached. If you have questions, please do not hesitate to call me. I look forward to following your patient along with you.  
 
 
Sincerely, 
 
Kristen Alba MD

## 2019-07-19 NOTE — PROGRESS NOTES
1. Have you been to the ER, urgent care clinic since your last visit? Hospitalized since your last visit? No.    2. Have you seen or consulted any other health care providers outside of the [address] [place] [address] since your last visit? Include any pap smears or colon screening. Seen by PCP.       Chief Complaint   Patient presents with    Annual Exam     dizziness and sob in the past 2 months

## 2019-07-20 DIAGNOSIS — E07.9 THYROID DISEASE: ICD-10-CM

## 2019-07-20 DIAGNOSIS — E03.1 CONGENITAL HYPOTHYROIDISM WITHOUT GOITER: Chronic | ICD-10-CM

## 2019-07-21 DIAGNOSIS — E03.1 CONGENITAL HYPOTHYROIDISM WITHOUT GOITER: Chronic | ICD-10-CM

## 2019-07-21 DIAGNOSIS — E07.9 THYROID DISEASE: ICD-10-CM

## 2019-07-22 RX ORDER — METOPROLOL SUCCINATE 25 MG/1
TABLET, EXTENDED RELEASE ORAL
Qty: 45 TAB | Refills: 0 | Status: SHIPPED | OUTPATIENT
Start: 2019-07-22 | End: 2019-12-16 | Stop reason: SDUPTHER

## 2019-07-22 NOTE — TELEPHONE ENCOUNTER
PCP: Ana Paula Scott MD    Last appt: 6/12/2019  Future Appointments   Date Time Provider Tara Donnelly   11/14/2019 10:45 AM Elissa Joyner MD [address] [facility]       Requested Prescriptions     Pending Prescriptions Disp Refills    levothyroxine (SYNTHROID) 150 mcg tablet 90 Tab 3     Sig: Take 1 Tab by mouth Daily (before breakfast).

## 2019-07-23 RX ORDER — LEVOTHYROXINE SODIUM 150 UG/1
150 TABLET ORAL
Qty: 90 TAB | Refills: 3 | Status: SHIPPED | OUTPATIENT
Start: 2019-07-23 | End: 2022-05-18 | Stop reason: SDUPTHER

## 2019-07-23 RX ORDER — LEVOTHYROXINE SODIUM 150 UG/1
TABLET ORAL
Qty: 30 TAB | Refills: 3 | Status: SHIPPED | OUTPATIENT
Start: 2019-07-23 | End: 2019-12-16 | Stop reason: SDUPTHER

## 2019-10-04 RX ORDER — LISINOPRIL 5 MG/1
TABLET ORAL
Qty: 90 TAB | Refills: 0 | Status: SHIPPED | OUTPATIENT
Start: 2019-10-04 | End: 2019-12-16 | Stop reason: SDUPTHER

## 2019-11-21 RX ORDER — ATORVASTATIN CALCIUM 40 MG/1
TABLET, FILM COATED ORAL
Qty: 90 TAB | Refills: 0 | OUTPATIENT
Start: 2019-11-21

## 2019-12-16 ENCOUNTER — OFFICE VISIT (OUTPATIENT)
Dept: CARDIOLOGY CLINIC | Age: 69
End: 2019-12-16

## 2019-12-16 VITALS
HEART RATE: 88 BPM | WEIGHT: 209 LBS | DIASTOLIC BLOOD PRESSURE: 80 MMHG | OXYGEN SATURATION: 96 % | HEIGHT: 68 IN | SYSTOLIC BLOOD PRESSURE: 138 MMHG | BODY MASS INDEX: 31.67 KG/M2 | RESPIRATION RATE: 16 BRPM

## 2019-12-16 DIAGNOSIS — I25.10 CORONARY ARTERY DISEASE INVOLVING NATIVE CORONARY ARTERY OF NATIVE HEART, ANGINA PRESENCE UNSPECIFIED: Primary | ICD-10-CM

## 2019-12-16 DIAGNOSIS — E78.00 HYPERCHOLESTEROLEMIA: ICD-10-CM

## 2019-12-16 DIAGNOSIS — I10 ESSENTIAL HYPERTENSION: Chronic | ICD-10-CM

## 2019-12-16 RX ORDER — LISINOPRIL 5 MG/1
TABLET ORAL
Qty: 90 TAB | Refills: 3 | Status: SHIPPED | OUTPATIENT
Start: 2019-12-16 | End: 2022-05-22 | Stop reason: SDUPTHER

## 2019-12-16 RX ORDER — METOPROLOL SUCCINATE 25 MG/1
TABLET, EXTENDED RELEASE ORAL
Qty: 45 TAB | Refills: 3 | Status: SHIPPED | OUTPATIENT
Start: 2019-12-16 | End: 2021-01-13

## 2019-12-16 NOTE — PROGRESS NOTES
Mauricio Brown, [facility]    Subjective/HPI:     Mr. Edward Pastor is a 77 y.o. male is here for routine f/u. He has a PMHx of CAD, THN, HLD and hypothyroidism. He notes no change in symptoms since last visit. He continues to work as a . Notes fatigue symptoms. He did not sign up for Medicare as advised last visit. He states he just didn't have time to do this. 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          PCP Provider  Carlee Jara MD    Patient Active Problem List   Diagnosis Code    NSTEMI (non-ST elevated myocardial infarction) ([facility] [address] I21.4    HTN (hypertension) I10    Troponin level elevated R79.89    Hypothyroidism E03.9    Hypercholesterolemia E78.00    Thyroid disease E07.9    Routine general medical examination at a health care facility Z00.00    Coronary artery disease involving native coronary artery of native heart I25.10    History of PTCA Z98.61     Past Surgical History:   Procedure Laterality Date    HX HEENT      tonsils     Family History   Problem Relation Age of Onset    Stroke Mother     Hypertension Mother     Heart Disease Father      Social History     Socioeconomic History    Marital status: SINGLE     Spouse name: Not on file    Number of children: Not on file    Years of education: Not on file    Highest education level: Not on file   Occupational History    Not on file   Social Needs    Financial resource strain: Not on file    Food insecurity:     Worry: Not on file     Inability: Not on file    Transportation needs:     Medical: Not on file     Non-medical: Not on file   Tobacco Use    Smoking status: Former Smoker     Years: 20.00     Types: Cigarettes     Last attempt to quit: 1990     Years since quittin.9    Smokeless tobacco: Never Used   Substance and Sexual Activity    Alcohol use: Yes     Frequency: 2-3 times a week     Drinks per session: 1 or 2    Drug use: Not Currently     Types: Cocaine     Comment: quit in [facility] 19 Sexual activity: Not Currently     Partners: Female   Lifestyle    Physical activity:     Days per week: Not on file     Minutes per session: Not on file    Stress: Not on file   Relationships    Social connections:     Talks on phone: Not on file     Gets together: Not on file     Attends [employer] service: Not on file     Active member of club or organization: Not on file     Attends meetings of clubs or organizations: Not on file     Relationship status: Not on file    Intimate partner violence:     Fear of current or ex partner: Not on file     Emotionally abused: Not on file     Physically abused: Not on file     Forced sexual activity: Not on file   Other Topics Concern    Not on file   Social History Narrative    Not on file       No Known Allergies     Current Outpatient Medications   Medication Sig    lisinopril (PRINIVIL, ZESTRIL) 5 mg tablet TAKE 1 TABLET BY MOUTH ONCE DAILY    metoprolol succinate (TOPROL-XL) 25 mg XL tablet TAKE 1/2 (ONE-HALF) TABLET BY MOUTH ONCE DAILY    levothyroxine (SYNTHROID) 150 mcg tablet Take 1 Tab by mouth Daily (before breakfast).  atorvastatin (LIPITOR) 40 mg tablet TAKE 1 TABLET BY MOUTH ONCE DAILY    nitroglycerin (NITROSTAT) 0.4 mg SL tablet 1 Tab by SubLINGual route every five (5) minutes as needed for Chest Pain (call 911 if not relieved by 3).  aspirin 81 mg chewable tablet Take 2 Tabs by mouth daily. No current facility-administered medications for this visit. I have reviewed the problem list, allergy list, medical history, family, social history and medications. Review of Symptoms:    Review of Systems   Constitutional: Negative for chills, fever and weight loss. HENT: Negative for nosebleeds. Eyes: Negative for blurred vision and double vision. Respiratory: Negative for cough, shortness of breath and wheezing. Cardiovascular: Negative for chest pain, palpitations, orthopnea, leg swelling and PND. Gastrointestinal: Negative for abdominal pain, blood in stool, diarrhea, nausea and vomiting. Musculoskeletal: Negative for joint pain. Skin: Negative for rash. Neurological: Negative for dizziness, tingling and loss of consciousness. Endo/Heme/Allergies: Does not bruise/bleed easily. Physical Exam:      General: Well developed, in no acute distress, cooperative and alert  HEENT: No carotid bruits, no JVD, trach is midline. Neck Supple, PEERL, EOM intact. Heart:  reg rate and rhythm; normal S1/S2; no murmurs, gallops or rubs. Respiratory: Clear bilaterally x 4, no wheezing or rales  Abdomen:   Soft, non-tender, no distention, no masses. + BS. Extremities:  Normal cap refill, no cyanosis, atraumatic. No edema. Neuro: A&Ox3, speech clear, gait stable. Skin: Skin color is normal. No rashes or lesions.  Non diaphoretic  Vascular: 2+ pulses symmetric in all extremities    Vitals:    12/16/19 1135 12/16/19 1144   BP: 130/70 138/80   Pulse: 88    Resp: 16    SpO2: 96%    Weight: 209 lb (94.8 kg)    Height: 5' 8\" (1.727 m)        Cardiographics    ECG: sinus rhythm  Results for orders placed or performed during the hospital encounter of 06/12/12   EKG, 12 LEAD, INITIAL   Result Value Ref Range    Ventricular Rate 87 BPM    Atrial Rate 87 BPM    P-R Interval 150 ms    QRS Duration 96 ms    Q-T Interval 364 ms    QTC Calculation (Bezet) 438 ms    Calculated P Axis 47 degrees    Calculated R Axis -36 degrees    Calculated T Axis 58 degrees    Diagnosis       Normal sinus rhythm  Left axis deviation  Incomplete right bundle branch block    Confirmed by Bernie Wills ([id number]) on 6/14/2012 12:35:09 PM       Cardiology Labs:  Lab Results   Component Value Date/Time    Cholesterol, total 180 07/18/2018 09:35 AM    HDL Cholesterol 42 07/18/2018 09:35 AM    LDL,Direct 131 (H) 05/29/2014 10:38 AM    LDL, calculated 120 (H) 07/18/2018 09:35 AM    Triglyceride 88 07/18/2018 09:35 AM    CHOL/HDL Ratio 5.4 (H) 06/13/2012 04:45 AM       Lab Results   Component Value Date/Time    Sodium 140 06/12/2019 02:24 PM    Potassium 4.3 06/12/2019 02:24 PM    Chloride 106 06/12/2019 02:24 PM    CO2 20 06/12/2019 02:24 PM    Anion gap 6 06/14/2012 05:00 AM    Glucose 77 06/12/2019 02:24 PM    BUN 17 06/12/2019 02:24 PM    Creatinine 1.27 06/12/2019 02:24 PM    BUN/Creatinine ratio 13 06/12/2019 02:24 PM    GFR est AA 67 06/12/2019 02:24 PM    GFR est non-AA 58 (L) 06/12/2019 02:24 PM    Calcium 9.0 06/12/2019 02:24 PM    Bilirubin, total 1.6 (H) 06/12/2019 02:24 PM    AST (SGOT) 22 06/12/2019 02:24 PM    Alk. phosphatase 62 06/12/2019 02:24 PM    Protein, total 6.7 06/12/2019 02:24 PM    Albumin 4.3 06/12/2019 02:24 PM    Globulin 3.8 06/13/2012 04:45 AM    A-G Ratio 1.8 06/12/2019 02:24 PM    ALT (SGPT) 21 06/12/2019 02:24 PM        Orders Placed This Encounter    LIPID PANEL    METABOLIC PANEL, COMPREHENSIVE    AMB POC EKG ROUTINE W/ 12 LEADS, INTER & REP     Order Specific Question:   Reason for Exam:     Answer:   ROUTINE    lisinopril (PRINIVIL, ZESTRIL) 5 mg tablet     Sig: TAKE 1 TABLET BY MOUTH ONCE DAILY     Dispense:  90 Tab     Refill:  3    metoprolol succinate (TOPROL-XL) 25 mg XL tablet     Sig: TAKE 1/2 (ONE-HALF) TABLET BY MOUTH ONCE DAILY     Dispense:  45 Tab     Refill:  3        Assessment:     Assessment:       ICD-10-CM ICD-9-CM    1. Coronary artery disease involving native coronary artery of native heart, angina presence unspecified I25.10 414.01 AMB POC EKG ROUTINE W/ 12 LEADS, INTER & REP   2. Essential hypertension I10 401.9    3. Hypercholesterolemia E78.00 272.0 LIPID PANEL      METABOLIC PANEL, COMPREHENSIVE        Plan:     1.  Coronary artery disease involving native coronary artery of native heart, angina presence unspecified  S/p BMS to the RCA in 7/2012  Stress test in 7/2014 was negative for ischemia  Symptoms unchanged in the past 4 months  Continues to decline stress testing due to cost Continue ASA, statin and BB therapy    2. Essential hypertension  BP controlled. Continue anti-hypertensive therapy and low sodium diet    3. Hypercholesterolemia  Has not yet had lipids checked  Will provide lab slip for lipid panel and CMP  Will refill atorvastatin once that is completed    4. Counseled on diet and exercise- eventual goal of 30-60 minutes 5-7 times a week as per AHA guidelines. Advised him once again to enroll into Medicare, as he is eligible. He states Angel Gaona is the problem\" and has too many other things to do and does not have time to enroll in this program.  Advised that open enrollment starts from Jan 1 to March 31st in 2020. Discussed the long-term cost if he remains self-pay and continues to decline necessary workup with his symptoms. F/u with Dr. Blanco Brizuela in 3 months-if still any concerns for anginal equivalent symptoms at that time, consider repeat stress testing.     Stephane Saenz MD

## 2019-12-16 NOTE — PROGRESS NOTES
1. Have you been to the ER, urgent care clinic since your last visit? Hospitalized since your last visit? NO      2. Have you seen or consulted any other health care providers outside of the [address] [place] [address] since your last visit? Include any pap smears or colon screening. NO    4 MONTH FOLLOW UP. C/O CHEST PAIN OFF AND ON.

## 2021-01-13 RX ORDER — METOPROLOL SUCCINATE 25 MG/1
TABLET, EXTENDED RELEASE ORAL
Qty: 45 TAB | Refills: 0 | Status: SHIPPED | OUTPATIENT
Start: 2021-01-13 | End: 2022-05-22 | Stop reason: SDUPTHER

## 2022-03-18 PROBLEM — Z00.00 ROUTINE GENERAL MEDICAL EXAMINATION AT A HEALTH CARE FACILITY: Status: ACTIVE | Noted: 2017-03-07

## 2022-03-18 PROBLEM — I25.10 CORONARY ARTERY DISEASE INVOLVING NATIVE CORONARY ARTERY OF NATIVE HEART: Status: ACTIVE | Noted: 2017-03-12

## 2022-03-19 PROBLEM — Z98.61 HISTORY OF PTCA: Status: ACTIVE | Noted: 2017-03-12

## 2022-05-16 ENCOUNTER — OFFICE VISIT (OUTPATIENT)
Dept: INTERNAL MEDICINE CLINIC | Age: 72
End: 2022-05-16

## 2022-05-16 VITALS
HEART RATE: 85 BPM | HEIGHT: 68 IN | OXYGEN SATURATION: 99 % | RESPIRATION RATE: 16 BRPM | TEMPERATURE: 97.7 F | WEIGHT: 214.4 LBS | SYSTOLIC BLOOD PRESSURE: 139 MMHG | BODY MASS INDEX: 32.49 KG/M2 | DIASTOLIC BLOOD PRESSURE: 87 MMHG

## 2022-05-16 DIAGNOSIS — I10 PRIMARY HYPERTENSION: Primary | ICD-10-CM

## 2022-05-16 DIAGNOSIS — E03.9 HYPOTHYROIDISM, UNSPECIFIED TYPE: ICD-10-CM

## 2022-05-16 DIAGNOSIS — R97.20 ELEVATED PSA: ICD-10-CM

## 2022-05-16 DIAGNOSIS — R73.09 ELEVATED HEMOGLOBIN A1C: ICD-10-CM

## 2022-05-16 DIAGNOSIS — E78.00 HYPERCHOLESTEROLEMIA: ICD-10-CM

## 2022-05-16 PROCEDURE — 99214 OFFICE O/P EST MOD 30 MIN: CPT | Performed by: FAMILY MEDICINE

## 2022-05-16 NOTE — PROGRESS NOTES
SUBJECTIVE:   Mr. Vanessa Eaton is a 71 y.o. male who is here for follow up of routine medical issues. It has been 3 years since his last appointment. He has not applied for medicare, and he currently does not have health insurance. Fatigue: He experiences fatigue and hypersomnlence. He has discontinued all prior chronic medications, which includes his thyroid medication. He had elevated PSA levels. He was informed to be snoring at night. He does not feel well rested after waking up, despite sleeping 20 hours. He experiences random episodes of chest pain. He has not taken his cholesterol medication. Bug Bite: He notes three tick bites. He noticed and plucked the ticks off in the shower. He denies a ring pattern forming or puss from the bites. Around the tick bites, there is swelling and redness. He notes worsening hearing and memory. He notes being overwhelmed by work and responsibilities. Bowel Movements: He notes only lighter brown stools. There was blood in his stool a few days ago. He denies struggling to pass the stool. He has history of hemorrhoids. The stool was not firm. At this time, he is otherwise doing well and has brought no other complaints to my attention today. For a list of the medical issues addressed today, see the assessment and plan below. PREVENTIVE:  PSA: Ordered   Tdap: Deferred   Pneumonia vaccine: Deferred   Shingrix: Deferred   Flu: Deferred  COVID: 0 Shots      PMH:   Past Medical History:   Diagnosis Date    HTN (hypertension)     Hypercholesterolemia     NSTEMI (non-ST elevated myocardial infarction) ([facility] [address]     Other ill-defined conditions(799.89)     thyroid, high cholesterol    Thyroid disease     Troponin I above reference range      PSH:  has a past surgical history that includes hx heent. All: has No Known Allergies. MEDS:   Current Outpatient Medications   Medication Sig    aspirin 81 mg chewable tablet Take 2 Tabs by mouth daily.     metoprolol succinate (TOPROL-XL) 25 mg XL tablet Take 1/2 (one-half) tablet by mouth once daily (Patient not taking: Reported on 5/16/2022)    lisinopril (PRINIVIL, ZESTRIL) 5 mg tablet TAKE 1 TABLET BY MOUTH ONCE DAILY (Patient not taking: Reported on 5/16/2022)    levothyroxine (SYNTHROID) 150 mcg tablet Take 1 Tab by mouth Daily (before breakfast). (Patient not taking: Reported on 5/16/2022)    atorvastatin (LIPITOR) 40 mg tablet TAKE 1 TABLET BY MOUTH ONCE DAILY (Patient not taking: Reported on 5/16/2022)    nitroglycerin (NITROSTAT) 0.4 mg SL tablet 1 Tab by SubLINGual route every five (5) minutes as needed for Chest Pain (call 911 if not relieved by 3). (Patient not taking: Reported on 5/16/2022)     No current facility-administered medications for this visit. FH: family history includes Heart Disease in his father; Hypertension in his mother; Stroke in his mother. SH:  reports that he quit smoking about 32 years ago. His smoking use included cigarettes. He quit after 20.00 years of use. He has never used smokeless tobacco. He reports current alcohol use. He reports previous drug use. Drug: Cocaine.      Review of Systems - History obtained from the patient  General ROS: no fever, chills, fatigue, body aches  Psychological ROS: no change in anxiety, depression, SI/HI  Ophthalmic ROS: no blurred vision, myopia, double vision  ENT ROS: no dysphagia, otalgia, otorrhea, rhinorrhea, post nasal drip  Respiratory ROS: no cough, shortness of breath, or wheezing  Cardiovascular ROS: no chest pain or dyspnea on exertion  Gastrointestinal ROS: no abdominal pain, change in bowel habits, or black or bloody stools  Genito-Urinary ROS: no frequency, urgency, incontinence, dysuria, hematouria  Musculoskeletal ROS: no arthralagia, myalgia  Neurological ROS: no headaches, dizziness, lightheadedness, tremors, seizures  Dermatological ROS: no rash or lesions    OBJECTIVE:   Vitals:   Visit Vitals  /87 (BP 1 Location: Left arm, BP Patient Position: Sitting, BP Cuff Size: Large adult)   Pulse 85   Temp 97.7 °F (36.5 °C) (Temporal)   Resp 16   Ht 5' 8\" (1.727 m)   Wt 214 lb 6.4 oz (97.3 kg)   SpO2 99%   BMI 32.60 kg/m²      Gen: Pleasant 71 y.o.  male in NAD. HEENT: PERRLA. EOMI. OP moist and pink. Neck: Supple. No LAD. HEART: RRR, No M/G/R.      LUNGS: CTAB No W/R. ABDOMEN: S, NT, ND, BS+. EXTREMITIES: Warm. No C/C/E.    MUSCULOSKELETAL: Normal ROM, muscle strength 5/5 all groups. NEURO: Alert and oriented x 3. Cranial nerves grossly intact. No focal sensory or motor deficits noted. SKIN: Warm. Dry. No rashes or other lesions noted. ASSESSMENT/ PLAN: Diagnoses and all orders for this visit:    1. Primary hypertension  -     CBC WITH AUTOMATED DIFF; Future  -     METABOLIC PANEL, COMPREHENSIVE; Future    2. Hypothyroidism, unspecified type  -     T4, FREE; Future  -     TSH 3RD GENERATION; Future    3. Hypercholesterolemia  -     LIPID PANEL; Future    4. Elevated PSA  -     PSA SCREENING (SCREENING); Future    5. Elevated hemoglobin A1c  -     HEMOGLOBIN A1C WITH EAG; Future      Primary hypertension: I ordered a CBC and CMP. Hypothyroidism, unspecified type: I ordered a T4 and TSH. Hypercholesterolemia: I ordered a lipid panel. Elevated PSA: I ordered a PSA screening. Elevated hemoglobin A1c: I ordered Hgb A1c. I will restart his medication based on the results of his blood work. I reinforced the future importance of medication adherence. He will inform us about when he obtains health insurance. ICD-10-CM ICD-9-CM    1. Primary hypertension  I10 401.9 CBC WITH AUTOMATED DIFF      METABOLIC PANEL, COMPREHENSIVE      METABOLIC PANEL, COMPREHENSIVE      CBC WITH AUTOMATED DIFF   2. Hypothyroidism, unspecified type  E03.9 244.9 T4, FREE      TSH 3RD GENERATION      TSH 3RD GENERATION      T4, FREE   3. Hypercholesterolemia  E78.00 272.0 LIPID PANEL      LIPID PANEL   4. Elevated PSA  R97.20 790.93 PSA SCREENING (SCREENING)      PSA SCREENING (SCREENING)   5. Elevated hemoglobin A1c  R73.09 790.29 HEMOGLOBIN A1C WITH EAG      HEMOGLOBIN A1C WITH EAG        I have reviewed the patient's medications and risks/side effects/benefits were discussed. Diagnosis(-es) explained to patient and questions answered. Literature provided where appropriate.      Written by Kade Calhoun, as dictated by Michael Gamez MD.

## 2022-05-17 LAB
ALBUMIN SERPL-MCNC: 3.9 G/DL (ref 3.5–5)
ALBUMIN/GLOB SERPL: 1.1 {RATIO} (ref 1.1–2.2)
ALP SERPL-CCNC: 56 U/L (ref 45–117)
ALT SERPL-CCNC: 46 U/L (ref 12–78)
ANION GAP SERPL CALC-SCNC: 6 MMOL/L (ref 5–15)
AST SERPL-CCNC: 28 U/L (ref 15–37)
BASOPHILS # BLD: 0.1 K/UL (ref 0–0.1)
BASOPHILS NFR BLD: 1 % (ref 0–1)
BILIRUB SERPL-MCNC: 1.2 MG/DL (ref 0.2–1)
BUN SERPL-MCNC: 25 MG/DL (ref 6–20)
BUN/CREAT SERPL: 21 (ref 12–20)
CALCIUM SERPL-MCNC: 9.6 MG/DL (ref 8.5–10.1)
CHLORIDE SERPL-SCNC: 108 MMOL/L (ref 97–108)
CHOLEST SERPL-MCNC: 252 MG/DL
CO2 SERPL-SCNC: 24 MMOL/L (ref 21–32)
CREAT SERPL-MCNC: 1.18 MG/DL (ref 0.7–1.3)
DIFFERENTIAL METHOD BLD: ABNORMAL
EOSINOPHIL # BLD: 0.3 K/UL (ref 0–0.4)
EOSINOPHIL NFR BLD: 3 % (ref 0–7)
ERYTHROCYTE [DISTWIDTH] IN BLOOD BY AUTOMATED COUNT: 12.9 % (ref 11.5–14.5)
EST. AVERAGE GLUCOSE BLD GHB EST-MCNC: 120 MG/DL
GLOBULIN SER CALC-MCNC: 3.6 G/DL (ref 2–4)
GLUCOSE SERPL-MCNC: 80 MG/DL (ref 65–100)
HBA1C MFR BLD: 5.8 % (ref 4–5.6)
HCT VFR BLD AUTO: 50 % (ref 36.6–50.3)
HDLC SERPL-MCNC: 41 MG/DL
HDLC SERPL: 6.1 {RATIO} (ref 0–5)
HGB BLD-MCNC: 16 G/DL (ref 12.1–17)
IMM GRANULOCYTES # BLD AUTO: 0.1 K/UL (ref 0–0.04)
IMM GRANULOCYTES NFR BLD AUTO: 1 % (ref 0–0.5)
LDLC SERPL CALC-MCNC: 170.8 MG/DL (ref 0–100)
LYMPHOCYTES # BLD: 2.3 K/UL (ref 0.8–3.5)
LYMPHOCYTES NFR BLD: 27 % (ref 12–49)
MCH RBC QN AUTO: 30.2 PG (ref 26–34)
MCHC RBC AUTO-ENTMCNC: 32 G/DL (ref 30–36.5)
MCV RBC AUTO: 94.5 FL (ref 80–99)
MONOCYTES # BLD: 0.7 K/UL (ref 0–1)
MONOCYTES NFR BLD: 9 % (ref 5–13)
NEUTS SEG # BLD: 5 K/UL (ref 1.8–8)
NEUTS SEG NFR BLD: 60 % (ref 32–75)
NRBC # BLD: 0 K/UL (ref 0–0.01)
NRBC BLD-RTO: 0 PER 100 WBC
PLATELET # BLD AUTO: 267 K/UL (ref 150–400)
PMV BLD AUTO: 9.6 FL (ref 8.9–12.9)
POTASSIUM SERPL-SCNC: 4.3 MMOL/L (ref 3.5–5.1)
PROT SERPL-MCNC: 7.5 G/DL (ref 6.4–8.2)
PSA SERPL-MCNC: 18 NG/ML (ref 0.01–4)
RBC # BLD AUTO: 5.29 M/UL (ref 4.1–5.7)
SODIUM SERPL-SCNC: 138 MMOL/L (ref 136–145)
T4 FREE SERPL-MCNC: 0.9 NG/DL (ref 0.8–1.5)
TRIGL SERPL-MCNC: 201 MG/DL (ref ?–150)
TSH SERPL DL<=0.05 MIU/L-ACNC: 11.1 UIU/ML (ref 0.36–3.74)
VLDLC SERPL CALC-MCNC: 40.2 MG/DL
WBC # BLD AUTO: 8.4 K/UL (ref 4.1–11.1)

## 2022-05-18 DIAGNOSIS — E07.9 THYROID DISEASE: ICD-10-CM

## 2022-05-18 DIAGNOSIS — E03.1 CONGENITAL HYPOTHYROIDISM WITHOUT GOITER: Chronic | ICD-10-CM

## 2022-05-18 NOTE — PROGRESS NOTES
Please inform this patient that his PSA is extremely high and he needs to see his urologist soon as possible. He will also need to restart his medication for his elevated lipids. I will be sending refills of medications to his pharmacy. His hemoglobin A1c is in the prediabetic range. His TSH is extremely high since he stopped taking his medication.

## 2022-05-18 NOTE — TELEPHONE ENCOUNTER
PCP: Cher Davenport MD    Last appt: 5/16/2022  Future Appointments   Date Time Provider Tara Donnelly   7/11/2022 11:20 AM Cher Davenport MD [facility] AMB       Requested Prescriptions     Pending Prescriptions Disp Refills    levothyroxine (SYNTHROID) 150 mcg tablet 90 Tablet 3     Sig: Take 1 Tablet by mouth Daily (before breakfast).     atorvastatin (LIPITOR) 40 mg tablet 90 Tablet 0     Sig: TAKE 1 TABLET BY MOUTH ONCE DAILY       Prior labs and Blood pressures:  BP Readings from Last 3 Encounters:   05/16/22 139/87   12/16/19 138/80   07/19/19 116/82     Lab Results   Component Value Date/Time    Sodium 138 05/16/2022 01:15 PM    Potassium 4.3 05/16/2022 01:15 PM    Chloride 108 05/16/2022 01:15 PM    CO2 24 05/16/2022 01:15 PM    Anion gap 6 05/16/2022 01:15 PM    Glucose 80 05/16/2022 01:15 PM    BUN 25 (H) 05/16/2022 01:15 PM    Creatinine 1.18 05/16/2022 01:15 PM    BUN/Creatinine ratio 21 (H) 05/16/2022 01:15 PM    GFR est AA >60 05/16/2022 01:15 PM    GFR est non-AA >60 05/16/2022 01:15 PM    Calcium 9.6 05/16/2022 01:15 PM     Lab Results   Component Value Date/Time    Hemoglobin A1c 5.8 (H) 05/16/2022 01:15 PM     Lab Results   Component Value Date/Time    Cholesterol, total 252 (H) 05/16/2022 01:15 PM    HDL Cholesterol 41 05/16/2022 01:15 PM    LDL,Direct 131 (H) 05/29/2014 10:38 AM    LDL, calculated 170.8 (H) 05/16/2022 01:15 PM    VLDL, calculated 40.2 05/16/2022 01:15 PM    Triglyceride 201 (H) 05/16/2022 01:15 PM    CHOL/HDL Ratio 6.1 (H) 05/16/2022 01:15 PM     No results found for: JULIOCESAR [place]    Lab Results   Component Value Date/Time    TSH 11.10 (H) 05/16/2022 01:15 PM

## 2022-05-19 ENCOUNTER — TELEPHONE (OUTPATIENT)
Dept: INTERNAL MEDICINE CLINIC | Age: 72
End: 2022-05-19

## 2022-05-19 NOTE — TELEPHONE ENCOUNTER
----- Message from Tra Blake sent at 5/19/2022  1:53 PM EDT -----  Subject: Message to Provider    QUESTIONS  Information for Provider? No answer on office line, patient had medication   concern and stated it was urgent. Sent to A/H line  ---------------------------------------------------------------------------  --------------  CALL BACK INFO  What is the best way for the office to contact you? OK to leave message on   voicemail  Preferred Call Back Phone Number? [phone]  ---------------------------------------------------------------------------  --------------  SCRIPT ANSWERS  Relationship to Patient?  Self

## 2022-05-19 NOTE — TELEPHONE ENCOUNTER
Patient states he needs a call back in reference to medication refills he was advised he's supposed to be taking. Medication refills were received yesterday & reminded patient of [address] on refills. Patient wants a call back to be advised when this will be approved Asap.  Thank you

## 2022-05-20 RX ORDER — LEVOTHYROXINE SODIUM 150 UG/1
150 TABLET ORAL
Qty: 90 TABLET | Refills: 3 | Status: SHIPPED | OUTPATIENT
Start: 2022-05-20

## 2022-05-20 RX ORDER — ATORVASTATIN CALCIUM 40 MG/1
TABLET, FILM COATED ORAL
Qty: 90 TABLET | Refills: 0 | Status: SHIPPED | OUTPATIENT
Start: 2022-05-20

## 2022-05-20 NOTE — TELEPHONE ENCOUNTER
Patient's caregiver, Lance Hernandez states she needs a call back to discuss why prescriptions requested on 5/18/22 are still pending. Please call.  Thank you

## 2022-05-22 RX ORDER — LISINOPRIL 5 MG/1
TABLET ORAL
Qty: 90 TABLET | Refills: 3 | Status: SHIPPED | OUTPATIENT
Start: 2022-05-22

## 2022-05-22 RX ORDER — METOPROLOL SUCCINATE 25 MG/1
TABLET, EXTENDED RELEASE ORAL
Qty: 45 TABLET | Refills: 5 | Status: SHIPPED | OUTPATIENT
Start: 2022-05-22

## 2022-06-21 ENCOUNTER — OFFICE VISIT (OUTPATIENT)
Dept: CARDIOLOGY CLINIC | Age: 72
End: 2022-06-21

## 2022-06-21 VITALS
WEIGHT: 214.4 LBS | HEART RATE: 73 BPM | BODY MASS INDEX: 32.49 KG/M2 | DIASTOLIC BLOOD PRESSURE: 78 MMHG | RESPIRATION RATE: 18 BRPM | SYSTOLIC BLOOD PRESSURE: 122 MMHG | OXYGEN SATURATION: 97 % | HEIGHT: 68 IN

## 2022-06-21 DIAGNOSIS — I25.10 CORONARY ARTERY DISEASE INVOLVING NATIVE CORONARY ARTERY OF NATIVE HEART WITHOUT ANGINA PECTORIS: Primary | ICD-10-CM

## 2022-06-21 DIAGNOSIS — E78.2 MIXED HYPERLIPIDEMIA: ICD-10-CM

## 2022-06-21 DIAGNOSIS — Z01.810 PREOP CARDIOVASCULAR EXAM: ICD-10-CM

## 2022-06-21 DIAGNOSIS — I10 BENIGN ESSENTIAL HTN: ICD-10-CM

## 2022-06-21 PROCEDURE — 99243 OFF/OP CNSLTJ NEW/EST LOW 30: CPT | Performed by: INTERNAL MEDICINE

## 2022-06-21 PROCEDURE — 93000 ELECTROCARDIOGRAM COMPLETE: CPT | Performed by: INTERNAL MEDICINE

## 2022-06-21 NOTE — PROGRESS NOTES
[address]: [place]  [phone]    History of Present Illness:  Mr. Deisi Duron is a 70 yo M with a history of coronary artery disease and bare metal stent to the RCA in 2012, essential hypertension, mixed hyperlipidemia, history of tobacco use quit in 1989, family history of early coronary artery disease, referred for preop cardiac evaluation prior to prostate biopsy. We did have a clearance form and filled this out for him. From a symptom standpoint cardiac wise, he denies any exertional chest pain, shortness of breath, palpitations, lightheadedness or dizziness. He has been compliant with his medications. He is compensated on exam with clear lungs and no lower extremity edema. His EKG here is normal sinus rhythm. There is a single PVC and reassured him this is benign. He does say that he is very active with work. Soc hx. Quit tobacco 1989  Fam hx. Father 53 yo CAD/MI. Assessment and Plan:   1. Coronary artery disease. Stable and compensated. Will continue his current regimen and focus is on prevention. Continue aspirin and statin, beta blocker and ACE inhibitor. 2. Preop cardiac evaluation. He is stable cardiac wise and low risk for cardiac complications. Aspirin can be held as needed and we did send over the preop form. 3. Essential hypertension. Blood pressure is controlled. 4. Mixed hyperlipidemia. Tolerating statin. 5. PVC, benign. 6. Tobacco use. Quit in 1989.    7. Hypothyroidism. He  has a past medical history of HTN (hypertension), Hypercholesterolemia, NSTEMI (non-ST elevated myocardial infarction) ([address] Other ill-defined conditions(799.89), Thyroid disease, and Troponin I above reference range. All other systems negative except as above. PE  Vitals:    06/21/22 1043   BP: 122/78   Resp: 18   Weight: 214 lb 6.4 oz (97.3 kg)   Height: 5' 8\" (1.727 m)    Body mass index is 32.6 kg/m².    General appearance - alert, well appearing, and in no distress  Mental status - affect appropriate to mood  Eyes - sclera anicteric, moist mucous membranes  Neck - supple, no JVD  Chest - clear to auscultation, no wheezes, rales or rhonchi  Heart - normal rate, regular rhythm, normal S1, S2, no murmurs, rubs, clicks or gallops  Abdomen - soft, nontender, nondistended, no masses or organomegaly  Neurological - no focal deficit  Extremities - peripheral pulses normal, no pedal edema    Recent Labs:  Lab Results   Component Value Date/Time    Cholesterol, total 252 (H) 2022 01:15 PM    HDL Cholesterol 41 2022 01:15 PM    LDL,Direct 131 (H) 2014 10:38 AM    LDL, calculated 170.8 (H) 2022 01:15 PM    Triglyceride 201 (H) 2022 01:15 PM    CHOL/HDL Ratio 6.1 (H) 2022 01:15 PM     Lab Results   Component Value Date/Time    Creatinine 1.18 2022 01:15 PM     Lab Results   Component Value Date/Time    BUN 25 (H) 2022 01:15 PM     Lab Results   Component Value Date/Time    Potassium 4.3 2022 01:15 PM     Lab Results   Component Value Date/Time    Hemoglobin A1c 5.8 (H) 2022 01:15 PM     Lab Results   Component Value Date/Time    HGB 16.0 2022 01:15 PM     Lab Results   Component Value Date/Time    PLATELET 572  01:15 PM       Reviewed:  Past Medical History:   Diagnosis Date    HTN (hypertension)     Hypercholesterolemia     NSTEMI (non-ST elevated myocardial infarction) ([facility] [address]     Other ill-defined conditions(799.89)     thyroid, high cholesterol    Thyroid disease     Troponin I above reference range      Social History     Tobacco Use   Smoking Status Former Smoker    Years: 20.00    Types: Cigarettes    Quit date: 1990    Years since quittin.4   Smokeless Tobacco Never Used     Social History     Substance and Sexual Activity   Alcohol Use Yes     No Known Allergies    Current Outpatient Medications   Medication Sig    metoprolol succinate (TOPROL-XL) 25 mg XL tablet Take 1 tablet daily.     lisinopriL (PRINIVIL, ZESTRIL) 5 mg tablet TAKE 1 TABLET BY MOUTH ONCE DAILY    levothyroxine (SYNTHROID) 150 mcg tablet Take 1 Tablet by mouth Daily (before breakfast).  atorvastatin (LIPITOR) 40 mg tablet TAKE 1 TABLET BY MOUTH ONCE DAILY    nitroglycerin (NITROSTAT) 0.4 mg SL tablet 1 Tab by SubLINGual route every five (5) minutes as needed for Chest Pain (call 911 if not relieved by 3).  aspirin 81 mg chewable tablet Take 2 Tabs by mouth daily. No current facility-administered medications for this visit.        Apryl Hernandez MD  [facility] heart and V[facility] [place]  [address] [facility] [address]  [address]

## 2022-06-21 NOTE — PROGRESS NOTES
Rm    Chief Complaint   Patient presents with   [facility] Patient        Visit Vitals  /78 (BP 1 Location: Left upper arm, BP Patient Position: Sitting, BP Cuff Size: Adult)   Resp 18   Ht 5' 8\" (1.727 m)   Wt 214 lb 6.4 oz (97.3 kg)   BMI 32.60 kg/m²    Pulse 73  O2 97    1. Have you been to the ER, urgent care clinic since your last visit? Hospitalized since your last visit? No    2. Have you seen or consulted any other health care providers outside of the [address] [place] [address] since your last visit? Include any pap smears or colon screening. No     Health Maintenance Due   Topic Date Due    Hepatitis C Screening  Never done    COVID-19 Vaccine (1) Never done    DTaP/Tdap/Td series (1 - Tdap) Never done    Colorectal Cancer Screening Combo  Never done    Shingrix Vaccine Age 50> (1 of 2) Never done    Pneumococcal 65+ years (1 - PCV) Never done        3 most recent PHQ Screens 6/21/2022   Little interest or pleasure in doing things Not at all   Feeling down, depressed, irritable, or hopeless Not at all   Total Score PHQ 2 0        Fall Risk Assessment, last 12 mths 6/21/2022   Able to walk? Yes   Fall in past 12 months? 0   Do you feel unsteady? 1   Are you worried about falling 0   Is TUG test greater than 12 seconds?  (No Data)   Is the gait abnormal? 0       Learning Assessment 7/11/2014   PRIMARY LEARNER Patient   HIGHEST LEVEL OF EDUCATION - PRIMARY LEARNER  SOME COLLEGE   BARRIERS PRIMARY LEARNER NONE   CO-LEARNER CAREGIVER No   PRIMARY LANGUAGE ENGLISH   LEARNER PREFERENCE PRIMARY LISTENING   ANSWERED BY patient   RELATIONSHIP SELF

## 2022-08-11 ENCOUNTER — OFFICE VISIT (OUTPATIENT)
Dept: INTERNAL MEDICINE CLINIC | Age: 72
End: 2022-08-11

## 2022-08-11 VITALS
HEART RATE: 64 BPM | TEMPERATURE: 98.4 F | OXYGEN SATURATION: 96 % | DIASTOLIC BLOOD PRESSURE: 70 MMHG | SYSTOLIC BLOOD PRESSURE: 123 MMHG | RESPIRATION RATE: 16 BRPM | WEIGHT: 217.8 LBS | HEIGHT: 68 IN | BODY MASS INDEX: 33.01 KG/M2

## 2022-08-11 DIAGNOSIS — R41.840 ATTENTION DISTURBANCE: ICD-10-CM

## 2022-08-11 DIAGNOSIS — R41.3 MEMORY DEFICIT: ICD-10-CM

## 2022-08-11 DIAGNOSIS — R73.09 ELEVATED HEMOGLOBIN A1C: ICD-10-CM

## 2022-08-11 DIAGNOSIS — I10 PRIMARY HYPERTENSION: ICD-10-CM

## 2022-08-11 DIAGNOSIS — E78.2 MIXED HYPERLIPIDEMIA: ICD-10-CM

## 2022-08-11 DIAGNOSIS — E03.8 OTHER SPECIFIED HYPOTHYROIDISM: Primary | ICD-10-CM

## 2022-08-11 LAB
ALBUMIN SERPL-MCNC: 3.9 G/DL (ref 3.5–5)
ALBUMIN/GLOB SERPL: 1.2 {RATIO} (ref 1.1–2.2)
ALP SERPL-CCNC: 70 U/L (ref 45–117)
ALT SERPL-CCNC: 41 U/L (ref 12–78)
ANION GAP SERPL CALC-SCNC: 8 MMOL/L (ref 5–15)
AST SERPL-CCNC: 21 U/L (ref 15–37)
BILIRUB SERPL-MCNC: 1.6 MG/DL (ref 0.2–1)
BUN SERPL-MCNC: 25 MG/DL (ref 6–20)
BUN/CREAT SERPL: 20 (ref 12–20)
CALCIUM SERPL-MCNC: 9.4 MG/DL (ref 8.5–10.1)
CHLORIDE SERPL-SCNC: 106 MMOL/L (ref 97–108)
CHOLEST SERPL-MCNC: 136 MG/DL
CO2 SERPL-SCNC: 25 MMOL/L (ref 21–32)
CREAT SERPL-MCNC: 1.22 MG/DL (ref 0.7–1.3)
EST. AVERAGE GLUCOSE BLD GHB EST-MCNC: 117 MG/DL
GLOBULIN SER CALC-MCNC: 3.3 G/DL (ref 2–4)
GLUCOSE SERPL-MCNC: 88 MG/DL (ref 65–100)
HBA1C MFR BLD: 5.7 % (ref 4–5.6)
HDLC SERPL-MCNC: 42 MG/DL
HDLC SERPL: 3.2 {RATIO} (ref 0–5)
LDLC SERPL CALC-MCNC: 72 MG/DL (ref 0–100)
POTASSIUM SERPL-SCNC: 4.8 MMOL/L (ref 3.5–5.1)
PROT SERPL-MCNC: 7.2 G/DL (ref 6.4–8.2)
SODIUM SERPL-SCNC: 139 MMOL/L (ref 136–145)
T4 FREE SERPL-MCNC: 1.5 NG/DL (ref 0.8–1.5)
TRIGL SERPL-MCNC: 110 MG/DL (ref ?–150)
TSH SERPL DL<=0.05 MIU/L-ACNC: 0.32 UIU/ML (ref 0.36–3.74)
VLDLC SERPL CALC-MCNC: 22 MG/DL

## 2022-08-11 PROCEDURE — G8417 CALC BMI ABV UP PARAM F/U: HCPCS | Performed by: FAMILY MEDICINE

## 2022-08-11 PROCEDURE — G8754 DIAS BP LESS 90: HCPCS | Performed by: FAMILY MEDICINE

## 2022-08-11 PROCEDURE — G8752 SYS BP LESS 140: HCPCS | Performed by: FAMILY MEDICINE

## 2022-08-11 PROCEDURE — 99213 OFFICE O/P EST LOW 20 MIN: CPT | Performed by: FAMILY MEDICINE

## 2022-08-11 PROCEDURE — G8510 SCR DEP NEG, NO PLAN REQD: HCPCS | Performed by: FAMILY MEDICINE

## 2022-08-11 PROCEDURE — 1101F PT FALLS ASSESS-DOCD LE1/YR: CPT | Performed by: FAMILY MEDICINE

## 2022-08-11 PROCEDURE — G8536 NO DOC ELDER MAL SCRN: HCPCS | Performed by: FAMILY MEDICINE

## 2022-08-11 PROCEDURE — G8427 DOCREV CUR MEDS BY ELIG CLIN: HCPCS | Performed by: FAMILY MEDICINE

## 2022-08-11 PROCEDURE — 3017F COLORECTAL CA SCREEN DOC REV: CPT | Performed by: FAMILY MEDICINE

## 2022-08-11 NOTE — PROGRESS NOTES
1. \"Have you been to the ER, urgent care clinic since your last visit? Hospitalized since your last visit? \" No    2. \"Have you seen or consulted any other health care providers outside of the [address] [place] [address] since your last visit? \" Yes Reason for visit: Urology. 3. For patients aged 39-70: Has the patient had a colonoscopy / FIT/ Cologuard? No      If the patient is female:    4. For patients aged 41-77: Has the patient had a mammogram within the past 2 years? NA - based on age or sex      11. For patients aged 21-65: Has the patient had a pap smear?  NA - based on age or sex

## 2022-08-11 NOTE — PROGRESS NOTES
SUBJECTIVE:   Mr. Winston Dos Santos is a 71 y.o. male who is here for follow up of routine medical issues. He has recently started medicare. He is working 7 days a week. He has followed up with his urologist. He had a biopsy, and it was negative for cancer. He still experiences fatigue. His last a1c was 5.8. He is taking his Synthroid at the same time as the rest of his medication. He takes Synthroid prior to eating a meal.     Sleep Hygiene: He is sleeping well. He goes to bed at 6pm or 7pm. He denies shortness of breathe. He followed up with a cardiologist in June. He stopped taking Aspirin. He notes worsening memory. He struggled to finish his medicare questionnaire. He takes an MMPI test at [facility] years ago. His hearing is worsening. He followed up with a therapist in the 70's for racing thoughts, prior to illegal substance usage. HLD: Pt is compliant in taking Lipitor. Patient denies abdominal pain, nausea, vomiting, diarrhea, arthralgias/myalgias due to the medication. Last lipid panel discussed with pt showed uncontrolled lipid levels. At this time, he is otherwise doing well and has brought no other complaints to my attention today. For a list of the medical issues addressed today, see the assessment and plan below. PMH:   Past Medical History:   Diagnosis Date    HTN (hypertension)     Hypercholesterolemia     NSTEMI (non-ST elevated myocardial infarction) ([facility] [address]     Other ill-defined conditions(799.89)     thyroid, high cholesterol    Thyroid disease     Troponin I above reference range      PSH:  has a past surgical history that includes hx heent. All: has No Known Allergies. MEDS:   Current Outpatient Medications   Medication Sig    metoprolol succinate (TOPROL-XL) 25 mg XL tablet Take 1 tablet daily. lisinopriL (PRINIVIL, ZESTRIL) 5 mg tablet TAKE 1 TABLET BY MOUTH ONCE DAILY    levothyroxine (SYNTHROID) 150 mcg tablet Take 1 Tablet by mouth Daily (before breakfast). atorvastatin (LIPITOR) 40 mg tablet TAKE 1 TABLET BY MOUTH ONCE DAILY    nitroglycerin (NITROSTAT) 0.4 mg SL tablet 1 Tab by SubLINGual route every five (5) minutes as needed for Chest Pain (call 911 if not relieved by 3). aspirin 81 mg chewable tablet Take 2 Tabs by mouth daily. No current facility-administered medications for this visit. FH: family history includes Heart Disease in his father; Hypertension in his mother; Stroke in his mother. SH:  reports that he quit smoking about 32 years ago. His smoking use included cigarettes. He has never used smokeless tobacco. He reports current alcohol use. He reports that he does not currently use drugs after having used the following drugs: Cocaine. Review of Systems - History obtained from the patient  General ROS: no fever, chills, fatigue, body aches  Psychological ROS: no change in anxiety, depression, SI/HI  Ophthalmic ROS: no blurred vision, myopia, double vision  ENT ROS: no dysphagia, otalgia, otorrhea, rhinorrhea, post nasal drip  Respiratory ROS: no cough, shortness of breath, or wheezing  Cardiovascular ROS: no chest pain or dyspnea on exertion  Gastrointestinal ROS: no abdominal pain, change in bowel habits, or black or bloody stools  Genito-Urinary ROS: no frequency, urgency, incontinence, dysuria, hematouria  Musculoskeletal ROS: no arthralagia, myalgia  Neurological ROS: no headaches, dizziness, lightheadedness, tremors, seizures  Dermatological ROS: no rash or lesions    OBJECTIVE:   Vitals: Visit Vitals  /70 (BP 1 Location: Left arm, BP Patient Position: Sitting, BP Cuff Size: Large adult)   Pulse 64   Temp 98.4 °F (36.9 °C) (Temporal)   Resp 16   Ht 5' 8\" (1.727 m)   Wt 217 lb 12.8 oz (98.8 kg)   SpO2 96%   BMI 33.12 kg/m²      Gen: Pleasant 71 y.o.  male in NAD. HEENT: PERRLA. EOMI. OP moist and pink. Neck: Supple. No LAD. HEART: RRR, No M/G/R.      LUNGS: CTAB No W/R. ABDOMEN: S, NT, ND, BS+.      EXTREMITIES: Warm. No C/C/E.    MUSCULOSKELETAL: Normal ROM, muscle strength 5/5 all groups. NEURO: Alert and oriented x 3. Cranial nerves grossly intact. No focal sensory or motor deficits noted. SKIN: Warm. Dry. No rashes or other lesions noted. ASSESSMENT/ PLAN: Diagnoses and all orders for this visit:    1. Other specified hypothyroidism  -     T4, FREE; Future  -     TSH 3RD GENERATION; Future    2. Elevated hemoglobin A1c  -     HEMOGLOBIN A1C WITH EAG; Future    3. Primary hypertension  -     METABOLIC PANEL, COMPREHENSIVE; Future    4. Mixed hyperlipidemia  -     LIPID PANEL; Future    5. Memory deficit  -     REFERRAL TO NEUROPSYCHOLOGY    6. Attention disturbance  -     REFERRAL TO NEUROPSYCHOLOGY      ICD-10-CM ICD-9-CM    1. Other specified hypothyroidism  E03.8 244.8 T4, FREE      TSH 3RD GENERATION      2. Elevated hemoglobin A1c  R73.09 790.29 HEMOGLOBIN A1C WITH EAG      3. Primary hypertension  L12 439.3 METABOLIC PANEL, COMPREHENSIVE      4. Mixed hyperlipidemia  E78.2 272.2 LIPID PANEL      5. Memory deficit  R41.3 780.93 REFERRAL TO NEUROPSYCHOLOGY      6. Attention disturbance  R41.840 799.51 REFERRAL TO NEUROPSYCHOLOGY           Follow-up and Dispositions    Return in about 6 months (around 2/11/2023) for follow up. Other Hypothyroidism: I ordered a T4 and TSH. He will take Synthroid an hour prior to his other medications. Elevated Hgb A1c: I ordered a Hgb A1c. Hypertension: BP seems to be well controlled. I recommended continuing current dose of medications, eating a low sodium diet, and increasing exercise. Recheck CMP. Hyperlipidemia: Lipid panel shows cholesterol seems to be well controlled. I recommended continuing current dose of Lipitor, eating a low fat diet, and increasing exercise. Recheck lipid panel. I advised him to take Lipitor at night. Memory Deficit / Attention Disturbance: I referred pt to Dr. Mary Arroyo.            I have reviewed the patient's medications and risks/side effects/benefits were discussed. Diagnosis(-es) explained to patient and questions answered. Literature provided where appropriate.      Written by Xander Singh, as dictated by Fidel Turner MD.

## 2022-09-05 NOTE — PROGRESS NOTES
Lipid panel: Normal range  Good work! Free T4 normal range  TSH: Low  A1c: Hemoglobin A1c is well controlled the prediabetic range. Continue to follow a diet low in simple carbohydrates and maintain regular physical activity. CMP:Normal electrolyte levels, and normal liver function. You have some mild renal insufficiency.

## 2023-02-13 ENCOUNTER — TELEPHONE (OUTPATIENT)
Dept: CARDIOLOGY CLINIC | Age: 73
End: 2023-02-13

## 2023-02-13 NOTE — TELEPHONE ENCOUNTER
Note found from 2012 from pt.  Chart by Chun Suarez MD:    CATH -PTCA/stent to OM1   PTCA/stent to mid RCA   R Radial 6 fr   No complications   Findings--LM large, ostial tapering   LAD--irreg up to 40, diagonals with ostial disease   LCx--non-dominant   OM1--medium 100% thrombotic   RCA--large, dominant mid 99% fibrotic   LV--LVEF 45-50% mild inferior and lateral wall HK   Intervention--PTCA/BMS to OM1 (Integrity 2.5/18)--0% residual/ROBERTO III/no dissection   PTCA/BMS to mid RCA (integrity 2.75/26)--0% residual/ROBERTO III/no dissection

## 2023-02-13 NOTE — TELEPHONE ENCOUNTER
[facility] is calling because they need the patient information for his stents that were placed in order to schedule the patient to have an MRI . They need the make and model information.     [address]  [phone] fax

## 2023-05-12 ENCOUNTER — TELEPHONE (OUTPATIENT)
Age: 73
End: 2023-05-12

## 2023-05-12 NOTE — TELEPHONE ENCOUNTER
Left message for patient to reschedule appointment on 06/20/2023 with Dr Bienvenido Linton. Provider will not be in office.     Mor Kingsley

## 2023-05-22 RX ORDER — ASPIRIN 81 MG/1
162 TABLET, CHEWABLE ORAL DAILY
COMMUNITY
Start: 2012-06-14 | End: 2023-06-29 | Stop reason: SDUPTHER

## 2023-05-22 RX ORDER — LISINOPRIL 5 MG/1
1 TABLET ORAL DAILY
COMMUNITY
Start: 2022-05-22 | End: 2023-06-29 | Stop reason: SDUPTHER

## 2023-05-22 RX ORDER — NITROGLYCERIN 0.4 MG/1
0.4 TABLET SUBLINGUAL
COMMUNITY
Start: 2017-03-09

## 2023-05-22 RX ORDER — METOPROLOL SUCCINATE 25 MG/1
1 TABLET, EXTENDED RELEASE ORAL DAILY
COMMUNITY
Start: 2022-05-22 | End: 2023-06-29 | Stop reason: SDUPTHER

## 2023-05-22 RX ORDER — ATORVASTATIN CALCIUM 40 MG/1
1 TABLET, FILM COATED ORAL DAILY
COMMUNITY
Start: 2022-05-20 | End: 2023-06-29 | Stop reason: SDUPTHER

## 2023-05-22 RX ORDER — LEVOTHYROXINE SODIUM 0.15 MG/1
150 TABLET ORAL
COMMUNITY
Start: 2022-05-20 | End: 2023-07-20 | Stop reason: SDUPTHER

## 2023-06-29 ENCOUNTER — OFFICE VISIT (OUTPATIENT)
Age: 73
End: 2023-06-29
Payer: MEDICARE

## 2023-06-29 VITALS
DIASTOLIC BLOOD PRESSURE: 88 MMHG | BODY MASS INDEX: 32.07 KG/M2 | OXYGEN SATURATION: 98 % | SYSTOLIC BLOOD PRESSURE: 128 MMHG | WEIGHT: 211.6 LBS | HEART RATE: 80 BPM | HEIGHT: 68 IN

## 2023-06-29 DIAGNOSIS — I25.10 CORONARY ARTERY DISEASE INVOLVING NATIVE CORONARY ARTERY OF NATIVE HEART WITHOUT ANGINA PECTORIS: Primary | ICD-10-CM

## 2023-06-29 DIAGNOSIS — Z87.891 HISTORY OF TOBACCO USE: ICD-10-CM

## 2023-06-29 DIAGNOSIS — I10 ESSENTIAL (PRIMARY) HYPERTENSION: ICD-10-CM

## 2023-06-29 DIAGNOSIS — E78.2 MIXED HYPERLIPIDEMIA: ICD-10-CM

## 2023-06-29 PROCEDURE — 99214 OFFICE O/P EST MOD 30 MIN: CPT | Performed by: INTERNAL MEDICINE

## 2023-06-29 PROCEDURE — 93005 ELECTROCARDIOGRAM TRACING: CPT | Performed by: INTERNAL MEDICINE

## 2023-06-29 RX ORDER — ASPIRIN 81 MG/1
81 TABLET, CHEWABLE ORAL DAILY
Qty: 90 TABLET | Refills: 3 | Status: SHIPPED | OUTPATIENT
Start: 2023-06-29

## 2023-06-29 RX ORDER — ATORVASTATIN CALCIUM 40 MG/1
40 TABLET, FILM COATED ORAL DAILY
Qty: 90 TABLET | Refills: 3 | Status: SHIPPED | OUTPATIENT
Start: 2023-06-29

## 2023-06-29 RX ORDER — METOPROLOL SUCCINATE 25 MG/1
25 TABLET, EXTENDED RELEASE ORAL DAILY
Qty: 90 TABLET | Refills: 3 | Status: SHIPPED | OUTPATIENT
Start: 2023-06-29

## 2023-06-29 RX ORDER — LISINOPRIL 5 MG/1
5 TABLET ORAL DAILY
Qty: 90 TABLET | Refills: 3 | Status: SHIPPED | OUTPATIENT
Start: 2023-06-29

## 2023-07-18 NOTE — TELEPHONE ENCOUNTER
----- Message from [facility] sent at 7/18/2023  9:22 AM EDT -----  Subject: Refill Request    QUESTIONS  Name of Medication? levothyroxine (SYNTHROID) 150 MCG tablet  Patient-reported dosage and instructions? levothyroxine 150mcg one a day  How many days do you have left? 0  Preferred Pharmacy? [facility]  Pharmacy phone number (if available)? [phone]  Additional Information for Provider? appt is scheduled for 8/16/23 but   needs pills to hold him over until appt has no pills please call pt to let   him know on refill  ---------------------------------------------------------------------------  --------------  CALL BACK INFO  What is the best way for the office to contact you? OK to leave message on   voicemail,Do not leave any message, patient will call back for answer  Preferred Call Back Phone Number? [phone]  ---------------------------------------------------------------------------  --------------  SCRIPT ANSWERS  Relationship to Patient?  Self

## 2023-07-20 RX ORDER — LEVOTHYROXINE SODIUM 0.15 MG/1
150 TABLET ORAL
Qty: 90 TABLET | Refills: 1 | Status: SHIPPED | OUTPATIENT
Start: 2023-07-20

## 2023-08-16 ENCOUNTER — OFFICE VISIT (OUTPATIENT)
Age: 73
End: 2023-08-16
Payer: MEDICARE

## 2023-08-16 VITALS
DIASTOLIC BLOOD PRESSURE: 86 MMHG | RESPIRATION RATE: 19 BRPM | HEART RATE: 81 BPM | OXYGEN SATURATION: 94 % | BODY MASS INDEX: 31.1 KG/M2 | TEMPERATURE: 98.4 F | WEIGHT: 210 LBS | HEIGHT: 69 IN | SYSTOLIC BLOOD PRESSURE: 131 MMHG

## 2023-08-16 DIAGNOSIS — R73.09 ELEVATED HEMOGLOBIN A1C: ICD-10-CM

## 2023-08-16 DIAGNOSIS — E03.9 HYPOTHYROIDISM, UNSPECIFIED TYPE: ICD-10-CM

## 2023-08-16 DIAGNOSIS — Z71.89 ACP (ADVANCE CARE PLANNING): ICD-10-CM

## 2023-08-16 DIAGNOSIS — R41.3 MEMORY DEFICIT: ICD-10-CM

## 2023-08-16 DIAGNOSIS — G47.9 SLEEP DISORDER: ICD-10-CM

## 2023-08-16 DIAGNOSIS — Z00.00 MEDICARE ANNUAL WELLNESS VISIT, SUBSEQUENT: Primary | ICD-10-CM

## 2023-08-16 DIAGNOSIS — Z12.5 SCREENING FOR PROSTATE CANCER: ICD-10-CM

## 2023-08-16 DIAGNOSIS — I10 PRIMARY HYPERTENSION: ICD-10-CM

## 2023-08-16 PROCEDURE — 3017F COLORECTAL CA SCREEN DOC REV: CPT | Performed by: FAMILY MEDICINE

## 2023-08-16 PROCEDURE — 3078F DIAST BP <80 MM HG: CPT | Performed by: FAMILY MEDICINE

## 2023-08-16 PROCEDURE — 1123F ACP DISCUSS/DSCN MKR DOCD: CPT | Performed by: FAMILY MEDICINE

## 2023-08-16 PROCEDURE — 3074F SYST BP LT 130 MM HG: CPT | Performed by: FAMILY MEDICINE

## 2023-08-16 PROCEDURE — G0439 PPPS, SUBSEQ VISIT: HCPCS | Performed by: FAMILY MEDICINE

## 2023-08-16 RX ORDER — CYANOCOBALAMIN (VITAMIN B-12) 500 MCG
TABLET ORAL
COMMUNITY

## 2023-08-16 RX ORDER — ZINC GLUCONATE 50 MG
50 TABLET ORAL DAILY
COMMUNITY

## 2023-08-16 ASSESSMENT — LIFESTYLE VARIABLES
HOW OFTEN DURING THE LAST YEAR HAVE YOU HAD A FEELING OF GUILT OR REMORSE AFTER DRINKING: 0
HAS A RELATIVE, FRIEND, DOCTOR, OR ANOTHER HEALTH PROFESSIONAL EXPRESSED CONCERN ABOUT YOUR DRINKING OR SUGGESTED YOU CUT DOWN: 0
HOW OFTEN DURING THE LAST YEAR HAVE YOU BEEN UNABLE TO REMEMBER WHAT HAPPENED THE NIGHT BEFORE BECAUSE YOU HAD BEEN DRINKING: 0
HOW OFTEN DURING THE LAST YEAR HAVE YOU FOUND THAT YOU WERE NOT ABLE TO STOP DRINKING ONCE YOU HAD STARTED: 0
HOW OFTEN DURING THE LAST YEAR HAVE YOU FAILED TO DO WHAT WAS NORMALLY EXPECTED FROM YOU BECAUSE OF DRINKING: 0
HOW MANY STANDARD DRINKS CONTAINING ALCOHOL DO YOU HAVE ON A TYPICAL DAY: 1 OR 2
HOW OFTEN DO YOU HAVE A DRINK CONTAINING ALCOHOL: 2-3 TIMES A WEEK
HOW OFTEN DURING THE LAST YEAR HAVE YOU NEEDED AN ALCOHOLIC DRINK FIRST THING IN THE MORNING TO GET YOURSELF GOING AFTER A NIGHT OF HEAVY DRINKING: 0
HAVE YOU OR SOMEONE ELSE BEEN INJURED AS A RESULT OF YOUR DRINKING: 0

## 2023-08-16 ASSESSMENT — PATIENT HEALTH QUESTIONNAIRE - PHQ9
2. FEELING DOWN, DEPRESSED OR HOPELESS: 0
SUM OF ALL RESPONSES TO PHQ QUESTIONS 1-9: 0
SUM OF ALL RESPONSES TO PHQ QUESTIONS 1-9: 0
1. LITTLE INTEREST OR PLEASURE IN DOING THINGS: 0
SUM OF ALL RESPONSES TO PHQ QUESTIONS 1-9: 0
SUM OF ALL RESPONSES TO PHQ9 QUESTIONS 1 & 2: 0
SUM OF ALL RESPONSES TO PHQ QUESTIONS 1-9: 0

## 2023-08-17 ENCOUNTER — TELEPHONE (OUTPATIENT)
Age: 73
End: 2023-08-17

## 2023-08-17 ENCOUNTER — NURSE ONLY (OUTPATIENT)
Age: 73
End: 2023-08-17

## 2023-08-17 DIAGNOSIS — I10 PRIMARY HYPERTENSION: ICD-10-CM

## 2023-08-17 DIAGNOSIS — Z00.00 MEDICARE ANNUAL WELLNESS VISIT, SUBSEQUENT: ICD-10-CM

## 2023-08-17 DIAGNOSIS — E03.9 HYPOTHYROIDISM, UNSPECIFIED TYPE: ICD-10-CM

## 2023-08-17 DIAGNOSIS — Z12.5 SCREENING FOR PROSTATE CANCER: ICD-10-CM

## 2023-08-17 DIAGNOSIS — R73.09 ELEVATED HEMOGLOBIN A1C: ICD-10-CM

## 2023-08-17 LAB
ALBUMIN SERPL-MCNC: 3.9 G/DL (ref 3.5–5)
ALBUMIN/GLOB SERPL: 1.2 (ref 1.1–2.2)
ALP SERPL-CCNC: 80 U/L (ref 45–117)
ALT SERPL-CCNC: 49 U/L (ref 12–78)
ANION GAP SERPL CALC-SCNC: 7 MMOL/L (ref 5–15)
AST SERPL-CCNC: 32 U/L (ref 15–37)
BASOPHILS # BLD: 0.1 K/UL (ref 0–0.1)
BASOPHILS NFR BLD: 1 % (ref 0–1)
BILIRUB SERPL-MCNC: 0.8 MG/DL (ref 0.2–1)
BUN SERPL-MCNC: 23 MG/DL (ref 6–20)
BUN/CREAT SERPL: 17 (ref 12–20)
CALCIUM SERPL-MCNC: 9.4 MG/DL (ref 8.5–10.1)
CHLORIDE SERPL-SCNC: 109 MMOL/L (ref 97–108)
CHOLEST SERPL-MCNC: 183 MG/DL
CO2 SERPL-SCNC: 26 MMOL/L (ref 21–32)
CREAT SERPL-MCNC: 1.38 MG/DL (ref 0.7–1.3)
DIFFERENTIAL METHOD BLD: ABNORMAL
EOSINOPHIL # BLD: 0.2 K/UL (ref 0–0.4)
EOSINOPHIL NFR BLD: 3 % (ref 0–7)
ERYTHROCYTE [DISTWIDTH] IN BLOOD BY AUTOMATED COUNT: 12.6 % (ref 11.5–14.5)
GLOBULIN SER CALC-MCNC: 3.2 G/DL (ref 2–4)
GLUCOSE SERPL-MCNC: 110 MG/DL (ref 65–100)
HCT VFR BLD AUTO: 49 % (ref 36.6–50.3)
HDLC SERPL-MCNC: 43 MG/DL
HDLC SERPL: 4.3 (ref 0–5)
HGB BLD-MCNC: 15.5 G/DL (ref 12.1–17)
IMM GRANULOCYTES # BLD AUTO: 0.1 K/UL (ref 0–0.04)
IMM GRANULOCYTES NFR BLD AUTO: 1 % (ref 0–0.5)
LDLC SERPL CALC-MCNC: 118.4 MG/DL (ref 0–100)
LYMPHOCYTES # BLD: 1.5 K/UL (ref 0.8–3.5)
LYMPHOCYTES NFR BLD: 21 % (ref 12–49)
MCH RBC QN AUTO: 29.6 PG (ref 26–34)
MCHC RBC AUTO-ENTMCNC: 31.6 G/DL (ref 30–36.5)
MCV RBC AUTO: 93.7 FL (ref 80–99)
MONOCYTES # BLD: 0.6 K/UL (ref 0–1)
MONOCYTES NFR BLD: 8 % (ref 5–13)
NEUTS SEG # BLD: 4.9 K/UL (ref 1.8–8)
NEUTS SEG NFR BLD: 66 % (ref 32–75)
NRBC # BLD: 0 K/UL (ref 0–0.01)
NRBC BLD-RTO: 0 PER 100 WBC
PLATELET # BLD AUTO: 269 K/UL (ref 150–400)
PMV BLD AUTO: 9.5 FL (ref 8.9–12.9)
POTASSIUM SERPL-SCNC: 4.4 MMOL/L (ref 3.5–5.1)
PROT SERPL-MCNC: 7.1 G/DL (ref 6.4–8.2)
PSA SERPL-MCNC: 17.9 NG/ML (ref 0.01–4)
RBC # BLD AUTO: 5.23 M/UL (ref 4.1–5.7)
SODIUM SERPL-SCNC: 142 MMOL/L (ref 136–145)
T4 FREE SERPL-MCNC: 1.4 NG/DL (ref 0.8–1.5)
TRIGL SERPL-MCNC: 108 MG/DL
TSH SERPL DL<=0.05 MIU/L-ACNC: 2.15 UIU/ML (ref 0.36–3.74)
VLDLC SERPL CALC-MCNC: 21.6 MG/DL
WBC # BLD AUTO: 7.4 K/UL (ref 4.1–11.1)

## 2023-08-18 LAB
EST. AVERAGE GLUCOSE BLD GHB EST-MCNC: 114 MG/DL
HBA1C MFR BLD: 5.6 % (ref 4–5.6)

## 2023-09-05 ENCOUNTER — TELEPHONE (OUTPATIENT)
Age: 73
End: 2023-09-05

## 2023-09-05 NOTE — TELEPHONE ENCOUNTER
Ervin Schaefer is asking for a call with lab results from 8-16-23. She has been waiting as this has to do with thyroid.

## 2023-09-11 ENCOUNTER — TELEPHONE (OUTPATIENT)
Age: 73
End: 2023-09-11

## 2023-09-11 DIAGNOSIS — R89.9 ABNORMAL LABORATORY TEST: ICD-10-CM

## 2023-09-11 DIAGNOSIS — I10 ESSENTIAL (PRIMARY) HYPERTENSION: Primary | ICD-10-CM

## 2023-09-11 NOTE — TELEPHONE ENCOUNTER
Two pt identifiers confirmed. I spoke to the patient's wife, I advised her per , \"Thyroid function: Normal  Hemoglobin A1c: Normal range  Lipids: All lipid levels are normal except for slight elevation of the LDL. Please follow a low in fat and try to exercise as tolerated 3 times a week. CBC: Red cell levels are normal, no evidence of any anemia  Mild elevation of granulocytes  CMP: Mild elevation of glucose and chloride. These are not significant elevations. There is also a mild elevation of the creatinine and a decrease in GFR. This may be related to patient's level of hydration. Please repeat the CMP. The liver function is normal.  Normal serum protein levels. PSA: The PSA is higher than it was in the prior tests. Patient is advised to follow-up with his urologist at his appointment. Please forward readings to his urologist.\"    Patient's wife verbalized understanding of information discussed w/ no further questions at this time.     Robert Latham per Uri Dueñas MD / Coco Solitario

## 2023-09-11 NOTE — TELEPHONE ENCOUNTER
----- Message from Francine Cabrera MD sent at 9/10/2023 11:46 PM EDT -----  Regarding: f/u  Please call Joey Dupree in his contact list.  ----- Message -----  From: Cheng Tadeo W[employer]  Sent: 8/17/2023   9:12 PM EDT  To: Francine Cabrera MD

## 2023-12-07 ENCOUNTER — HOSPITAL ENCOUNTER (EMERGENCY)
Facility: HOSPITAL | Age: 73
Discharge: LEFT BEFORE DISCHARGE | End: 2023-12-07

## 2023-12-07 VITALS
BODY MASS INDEX: 33.67 KG/M2 | RESPIRATION RATE: 16 BRPM | WEIGHT: 227.29 LBS | SYSTOLIC BLOOD PRESSURE: 163 MMHG | TEMPERATURE: 97.9 F | HEART RATE: 65 BPM | DIASTOLIC BLOOD PRESSURE: 86 MMHG | OXYGEN SATURATION: 98 % | HEIGHT: 69 IN

## 2023-12-07 ASSESSMENT — PAIN SCALES - GENERAL: PAINLEVEL_OUTOF10: 8

## 2023-12-07 ASSESSMENT — PAIN - FUNCTIONAL ASSESSMENT: PAIN_FUNCTIONAL_ASSESSMENT: 0-10

## 2024-05-29 RX ORDER — LEVOTHYROXINE SODIUM 0.15 MG/1
150 TABLET ORAL
Qty: 90 TABLET | Refills: 1 | Status: SHIPPED | OUTPATIENT
Start: 2024-05-29

## 2024-05-30 RX ORDER — ATORVASTATIN CALCIUM 40 MG/1
40 TABLET, FILM COATED ORAL DAILY
Qty: 90 TABLET | Refills: 0 | Status: SHIPPED | OUTPATIENT
Start: 2024-05-30

## 2024-05-30 NOTE — TELEPHONE ENCOUNTER
Requested Prescriptions     Signed Prescriptions Disp Refills    atorvastatin (LIPITOR) 40 MG tablet 90 tablet 0     Sig: Take 1 tablet by mouth daily     Authorizing Provider: JUAN MARTINEZ     Ordering User: DILLON COVARRUBIAS MD    Future Appointments   Date Time Provider Department Center   7/11/2024  8:40 AM Juan Martinez MD [facility]

## 2024-08-27 ENCOUNTER — OFFICE VISIT (OUTPATIENT)
Age: 74
End: 2024-08-27
Payer: MEDICARE

## 2024-08-27 VITALS
BODY MASS INDEX: 33.33 KG/M2 | OXYGEN SATURATION: 95 % | DIASTOLIC BLOOD PRESSURE: 78 MMHG | WEIGHT: 225 LBS | HEIGHT: 69 IN | SYSTOLIC BLOOD PRESSURE: 130 MMHG | HEART RATE: 67 BPM

## 2024-08-27 DIAGNOSIS — Z87.891 HISTORY OF TOBACCO USE: ICD-10-CM

## 2024-08-27 DIAGNOSIS — I25.10 CORONARY ARTERY DISEASE INVOLVING NATIVE CORONARY ARTERY OF NATIVE HEART WITHOUT ANGINA PECTORIS: Primary | ICD-10-CM

## 2024-08-27 DIAGNOSIS — E78.2 MIXED HYPERLIPIDEMIA: ICD-10-CM

## 2024-08-27 DIAGNOSIS — I10 ESSENTIAL (PRIMARY) HYPERTENSION: ICD-10-CM

## 2024-08-27 PROCEDURE — 1123F ACP DISCUSS/DSCN MKR DOCD: CPT | Performed by: INTERNAL MEDICINE

## 2024-08-27 PROCEDURE — 3075F SYST BP GE 130 - 139MM HG: CPT | Performed by: INTERNAL MEDICINE

## 2024-08-27 PROCEDURE — 99214 OFFICE O/P EST MOD 30 MIN: CPT | Performed by: INTERNAL MEDICINE

## 2024-08-27 PROCEDURE — 3078F DIAST BP <80 MM HG: CPT | Performed by: INTERNAL MEDICINE

## 2024-08-27 PROCEDURE — G8427 DOCREV CUR MEDS BY ELIG CLIN: HCPCS | Performed by: INTERNAL MEDICINE

## 2024-08-27 PROCEDURE — 93010 ELECTROCARDIOGRAM REPORT: CPT | Performed by: INTERNAL MEDICINE

## 2024-08-27 PROCEDURE — G8417 CALC BMI ABV UP PARAM F/U: HCPCS | Performed by: INTERNAL MEDICINE

## 2024-08-27 PROCEDURE — 1036F TOBACCO NON-USER: CPT | Performed by: INTERNAL MEDICINE

## 2024-08-27 PROCEDURE — 93005 ELECTROCARDIOGRAM TRACING: CPT | Performed by: INTERNAL MEDICINE

## 2024-08-27 PROCEDURE — 3017F COLORECTAL CA SCREEN DOC REV: CPT | Performed by: INTERNAL MEDICINE

## 2024-08-27 ASSESSMENT — PATIENT HEALTH QUESTIONNAIRE - PHQ9
SUM OF ALL RESPONSES TO PHQ QUESTIONS 1-9: 0
2. FEELING DOWN, DEPRESSED OR HOPELESS: NOT AT ALL
SUM OF ALL RESPONSES TO PHQ9 QUESTIONS 1 & 2: 0
SUM OF ALL RESPONSES TO PHQ QUESTIONS 1-9: 0
SUM OF ALL RESPONSES TO PHQ QUESTIONS 1-9: 0
1. LITTLE INTEREST OR PLEASURE IN DOING THINGS: NOT AT ALL
SUM OF ALL RESPONSES TO PHQ QUESTIONS 1-9: 0

## 2024-08-27 NOTE — PATIENT INSTRUCTIONS
Dr. Valerie Quiroz    [facility]  [address], [address], [place] [address] [phone]

## 2024-08-27 NOTE — PROGRESS NOTES
[facility] [place]: [place]  [phone]    History of Present Illness:  Mr. Busby is a 75 yo M with a history of coronary artery disease and bare metal stent to the RCA in 2012, essential hypertension, mixed hyperlipidemia, history of tobacco use quit in 1989, family history of early coronary artery disease.    Since last visit overall he has been doing okay. He had vertigo for a week and a half, but that resolved.  He denies any exertional chest pain.  Breathing has been stable. He does feel tired \"all the time\".  We did talk about possibly thinking about a sleep study, but he wants to hold off. His weight is up from 211 to 225 pounds and he does admit to some dietary indiscretion.  He wants to work towards 205 pounds next year. He is compensated on exam with clear lungs and no lower extremity edema.  EKG was normal sinus rhythm.  Soc hx. Quit tobacco 1989  Fam hx. Father 50 yo CAD/MI.  Assessment/Plan:  1. Coronary artery disease.  Stable and compensated and no changes made.  Continue aspirin, statin, beta blocker, ACE inhibitor and will have him follow back in one year.  2. Obesity.  His weight is up and talked about watching his dietary intake and getting in regular exercise and activity.  3. Essential hypertension.  Blood pressure controlled, no changes made.  4. Mixed hyperlipidemia.  Tolerating statin.  Goal LDL less than 70.  Will recheck lipids and chemistry panel.  5. PVCs, benign.  6. Tobacco use.  Quit in 1989.  7. Hypothyroid.        He  has a past medical history of HTN (hypertension), Hypercholesterolemia, NSTEMI (non-ST elevated myocardial infarction) (HCC), Other ill-defined conditions(799.89), Thyroid disease, and Troponin I above reference range.    All other systems negative except as above.     PE  Vitals:    08/27/24 0928   BP: 130/78   Site: Left Upper Arm   Position: Sitting   Cuff Size: Large Adult   Pulse: 67   SpO2: 95%   Weight: 102.1 kg (225 lb)   Height: 1.753 m (5' 9\")      Body mass

## 2024-09-17 ENCOUNTER — HOSPITAL ENCOUNTER (EMERGENCY)
Facility: HOSPITAL | Age: 74
Discharge: HOME | End: 2024-09-17
Attending: EMERGENCY MEDICINE
Payer: MEDICARE

## 2024-09-17 VITALS
DIASTOLIC BLOOD PRESSURE: 93 MMHG | HEIGHT: 69 IN | RESPIRATION RATE: 18 BRPM | OXYGEN SATURATION: 95 % | WEIGHT: 228.18 LBS | TEMPERATURE: 98.4 F | BODY MASS INDEX: 33.8 KG/M2 | HEART RATE: 92 BPM | SYSTOLIC BLOOD PRESSURE: 159 MMHG

## 2024-09-17 DIAGNOSIS — K08.89 PAIN, DENTAL: Primary | ICD-10-CM

## 2024-09-17 PROCEDURE — 96372 THER/PROPH/DIAG INJ SC/IM: CPT

## 2024-09-17 PROCEDURE — 6360000002 HC RX W HCPCS

## 2024-09-17 PROCEDURE — 6370000000 HC RX 637 (ALT 250 FOR IP)

## 2024-09-17 PROCEDURE — 99284 EMERGENCY DEPT VISIT MOD MDM: CPT

## 2024-09-17 RX ORDER — PENICILLIN V POTASSIUM 250 MG/1
500 TABLET, FILM COATED ORAL
Status: COMPLETED | OUTPATIENT
Start: 2024-09-17 | End: 2024-09-17

## 2024-09-17 RX ORDER — PENICILLIN V POTASSIUM 500 MG/1
500 TABLET, FILM COATED ORAL 4 TIMES DAILY
Qty: 28 TABLET | Refills: 0 | Status: SHIPPED | OUTPATIENT
Start: 2024-09-17 | End: 2024-09-24

## 2024-09-17 RX ORDER — KETOROLAC TROMETHAMINE 30 MG/ML
15 INJECTION, SOLUTION INTRAMUSCULAR; INTRAVENOUS
Status: COMPLETED | OUTPATIENT
Start: 2024-09-17 | End: 2024-09-17

## 2024-09-17 RX ORDER — OXYCODONE HYDROCHLORIDE 5 MG/1
5 TABLET ORAL EVERY 6 HOURS PRN
Qty: 12 TABLET | Refills: 0 | Status: SHIPPED | OUTPATIENT
Start: 2024-09-17 | End: 2024-09-20

## 2024-09-17 RX ORDER — ACETAMINOPHEN 500 MG
1000 TABLET ORAL EVERY 8 HOURS PRN
Qty: 60 TABLET | Refills: 0 | Status: SHIPPED | OUTPATIENT
Start: 2024-09-17

## 2024-09-17 RX ORDER — OXYCODONE AND ACETAMINOPHEN 5; 325 MG/1; MG/1
2 TABLET ORAL
Status: COMPLETED | OUTPATIENT
Start: 2024-09-17 | End: 2024-09-17

## 2024-09-17 RX ADMIN — KETOROLAC TROMETHAMINE 15 MG: 30 INJECTION, SOLUTION INTRAMUSCULAR at 18:16

## 2024-09-17 RX ADMIN — DIPHENHYDRAMINE HCL ORAL: 25 SOLUTION ORAL at 18:19

## 2024-09-17 RX ADMIN — OXYCODONE HYDROCHLORIDE AND ACETAMINOPHEN 2 TABLET: 5; 325 TABLET ORAL at 18:14

## 2024-09-17 RX ADMIN — PENICILLIN V POTASSIUM 500 MG: 250 TABLET, FILM COATED ORAL at 18:14

## 2024-09-17 ASSESSMENT — PAIN SCALES - GENERAL
PAINLEVEL_OUTOF10: 7
PAINLEVEL_OUTOF10: 7

## 2024-09-17 ASSESSMENT — PAIN DESCRIPTION - LOCATION
LOCATION: TEETH
LOCATION: TEETH

## 2024-09-17 ASSESSMENT — LIFESTYLE VARIABLES
HOW OFTEN DO YOU HAVE A DRINK CONTAINING ALCOHOL: MONTHLY OR LESS
HOW MANY STANDARD DRINKS CONTAINING ALCOHOL DO YOU HAVE ON A TYPICAL DAY: 1 OR 2

## 2024-09-17 ASSESSMENT — PAIN DESCRIPTION - ORIENTATION: ORIENTATION: LEFT;UPPER

## 2025-01-16 RX ORDER — ATORVASTATIN CALCIUM 40 MG/1
40 TABLET, FILM COATED ORAL DAILY
Qty: 90 TABLET | Refills: 1 | Status: SHIPPED | OUTPATIENT
Start: 2025-01-16

## 2025-01-16 NOTE — TELEPHONE ENCOUNTER
Requested Prescriptions     Signed Prescriptions Disp Refills    atorvastatin (LIPITOR) 40 MG tablet 90 tablet 1     Sig: Take 1 tablet by mouth once daily     Authorizing Provider: JUAN MARTINEZ     Ordering User: DILLON COVARRUBIAS MD    Future Appointments   Date Time Provider Department Center   8/27/2025  8:40 AM Juan Martinez MD [facility]

## 2025-03-20 RX ORDER — METOPROLOL SUCCINATE 25 MG/1
25 TABLET, EXTENDED RELEASE ORAL DAILY
Qty: 90 TABLET | Refills: 1 | Status: SHIPPED | OUTPATIENT
Start: 2025-03-20

## 2025-03-20 NOTE — TELEPHONE ENCOUNTER
Requested Prescriptions     Signed Prescriptions Disp Refills    metoprolol succinate (TOPROL XL) 25 MG extended release tablet 90 tablet 1     Sig: Take 1 tablet by mouth once daily     Authorizing Provider: JUAN MARTINEZ     Ordering User: DILLON COVARRUBIAS MD    Future Appointments   Date Time Provider Department Center   8/27/2025  8:40 AM Juan Martinez MD [facility]

## 2025-05-29 RX ORDER — ATORVASTATIN CALCIUM 40 MG/1
40 TABLET, FILM COATED ORAL DAILY
Qty: 90 TABLET | Refills: 0 | Status: SHIPPED | OUTPATIENT
Start: 2025-05-29

## 2025-05-29 NOTE — TELEPHONE ENCOUNTER
Requested Prescriptions     Signed Prescriptions Disp Refills    atorvastatin (LIPITOR) 40 MG tablet 90 tablet 0     Sig: Take 1 tablet by mouth daily Please get fasting labs drawn for future refills.     Authorizing Provider: JUAN MARTINEZ     Ordering User: DILLON COVARRUBIAS MD    Future Appointments   Date Time Provider Department Center   8/27/2025  8:40 AM Juan Martinez MD [facility]

## 2025-07-30 ENCOUNTER — HOSPITAL ENCOUNTER (EMERGENCY)
Facility: HOSPITAL | Age: 75
Discharge: HOME | End: 2025-07-30
Payer: MEDICARE

## 2025-07-30 VITALS
TEMPERATURE: 97.3 F | HEIGHT: 69 IN | RESPIRATION RATE: 16 BRPM | WEIGHT: 230 LBS | DIASTOLIC BLOOD PRESSURE: 88 MMHG | OXYGEN SATURATION: 98 % | SYSTOLIC BLOOD PRESSURE: 145 MMHG | BODY MASS INDEX: 34.07 KG/M2 | HEART RATE: 90 BPM

## 2025-07-30 DIAGNOSIS — S61.451A CAT BITE OF HAND, RIGHT, INITIAL ENCOUNTER: Primary | ICD-10-CM
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
## 2025-07-30 DIAGNOSIS — Z23 NEED FOR PROPHYLACTIC VACCINATION AND INOCULATION AGAINST RABIES: ICD-10-CM

## 2025-07-30 DIAGNOSIS — Z23 NEED FOR DIPHTHERIA-TETANUS-PERTUSSIS (TDAP) VACCINE: ICD-10-CM

## 2025-07-30 PROCEDURE — 6360000002 HC RX W HCPCS: Performed by: PHYSICIAN ASSISTANT

## 2025-07-30 PROCEDURE — 90375 RABIES IG IM/SC: CPT | Performed by: PHYSICIAN ASSISTANT

## 2025-07-30 PROCEDURE — 90715 TDAP VACCINE 7 YRS/> IM: CPT | Performed by: PHYSICIAN ASSISTANT

## 2025-07-30 PROCEDURE — 96372 THER/PROPH/DIAG INJ SC/IM: CPT

## 2025-07-30 PROCEDURE — 90472 IMMUNIZATION ADMIN EACH ADD: CPT | Performed by: PHYSICIAN ASSISTANT

## 2025-07-30 PROCEDURE — 6370000000 HC RX 637 (ALT 250 FOR IP): Performed by: PHYSICIAN ASSISTANT

## 2025-07-30 PROCEDURE — 99284 EMERGENCY DEPT VISIT MOD MDM: CPT

## 2025-07-30 PROCEDURE — 90471 IMMUNIZATION ADMIN: CPT | Performed by: PHYSICIAN ASSISTANT

## 2025-07-30 PROCEDURE — 90675 RABIES VACCINE IM: CPT | Performed by: PHYSICIAN ASSISTANT

## 2025-07-30 RX ADMIN — AMOXICILLIN AND CLAVULANATE POTASSIUM 1 TABLET: 875; 125 TABLET, FILM COATED ORAL at 16:46

## 2025-07-30 RX ADMIN — TETANUS TOXOID, REDUCED DIPHTHERIA TOXOID AND ACELLULAR PERTUSSIS VACCINE, ADSORBED 0.5 ML: 5; 2.5; 8; 8; 2.5 SUSPENSION INTRAMUSCULAR at 16:46

## 2025-07-30 RX ADMIN — RABIES IMMUNE GLOBULIN (HUMAN) 600 UNITS: 300 INJECTION, SOLUTION INFILTRATION; INTRAMUSCULAR at 16:48

## 2025-07-30 RX ADMIN — Medication 1 ML: at 16:47

## 2025-07-30 RX ADMIN — RABIES IMMUNE GLOBULIN (HUMAN) 1500 UNITS: 300 INJECTION, SOLUTION INFILTRATION; INTRAMUSCULAR at 16:47

## 2025-07-30 ASSESSMENT — PAIN DESCRIPTION - ORIENTATION: ORIENTATION: RIGHT

## 2025-07-30 ASSESSMENT — PAIN DESCRIPTION - FREQUENCY: FREQUENCY: INTERMITTENT

## 2025-07-30 ASSESSMENT — PAIN DESCRIPTION - ONSET: ONSET: ON-GOING

## 2025-07-30 ASSESSMENT — PAIN - FUNCTIONAL ASSESSMENT
PAIN_FUNCTIONAL_ASSESSMENT: 0-10
PAIN_FUNCTIONAL_ASSESSMENT: PREVENTS OR INTERFERES SOME ACTIVE ACTIVITIES AND ADLS

## 2025-07-30 ASSESSMENT — PAIN DESCRIPTION - PAIN TYPE: TYPE: ACUTE PAIN

## 2025-07-30 ASSESSMENT — PAIN DESCRIPTION - LOCATION: LOCATION: HAND

## 2025-07-30 ASSESSMENT — PAIN DESCRIPTION - DESCRIPTORS: DESCRIPTORS: ACHING

## 2025-07-30 NOTE — DISCHARGE INSTRUCTIONS
It was a pleasure taking care of you at [facility] today.      We know that when you come to [facility], you are entrusting us with your health, comfort, and safety.  Our physicians and nurses honor that trust, and we appreciate the opportunity to care for you and your loved ones.  We also value your feedback, and we would like to hear from you.    When you receive a  >>> survey <<< about your Emergency Department experience today, please fill it out. We review every single response from our patients. Thank you!

## 2025-07-30 NOTE — CARE COORDINATION
CM has completed new [facility] request for rabies vaccination series. CM has faxed demographic sheet, infusion order form, and script to [facility] f) [phone]. [facility]  to contact pt directly to schedule.        MONA Alegria.  Care Manager, [facility]  [phone] on [employer]

## 2025-07-30 NOTE — ED PROVIDER NOTES
[facility] EMERGENCY DEPARTMENT  EMERGENCY DEPARTMENT ENCOUNTER       Pt Name: Donell Busby  MRN: [id number]  Birthdate 1950  Date of evaluation: 7/30/2025  Provider: CASTILLO Hayden   PCP: Stephy Pyle MD  Note Started: 3:44 PM EDT 7/30/25     CHIEF COMPLAINT       Chief Complaint   Patient presents with    Animal Bite     Pt states he was bit by a stray cat to R hand/wrist yesterday. Pt lives in [facility]. Pt denies fevers or drainage. Pt did not yet call animal control       HISTORY OF PRESENT ILLNESS: 1 or more elements      History From: Patient  HPI Limitations: None     Donell Busby is a 75 y.o. male who presents by POV for evaluation of a wound to his right, dominant hand.  He was bitten by a stray cat yesterday.  He awoke today and noticed swelling to the dorsal aspect of the hand.  Patient reports that the cat has been hanging around his home for about a year.  There is been no strange behavior.  They have been feeding the cat.  The cat bit him when he was attempting to move his food.  Unknown immunization status to the cat.  Patient also does not remember when his last tetanus booster was.     Nursing Notes were all reviewed and agreed with or any disagreements were addressed in the HPI.     REVIEW OF SYSTEMS      Review of Systems     Positives and Pertinent negatives as per HPI.    PAST HISTORY     Past Medical History:  Past Medical History:   Diagnosis Date    HTN (hypertension)     Hypercholesterolemia     NSTEMI (non-ST elevated myocardial infarction) (HCC)     Other ill-defined conditions(799.89)     thyroid, high cholesterol    Thyroid disease     Troponin I above reference range        Past Surgical History:  Past Surgical History:   Procedure Laterality Date    HEENT      tonsils       Family History:  Family History   Problem Relation Age of Onset    Hypertension Mother     Heart Disease Father     Stroke Mother        Social History:  Social History     Tobacco Use

## 2025-07-31 PROBLEM — Z20.3 RABIES EXPOSURE: Status: ACTIVE | Noted: 2025-07-31

## 2025-08-02 ENCOUNTER — HOSPITAL ENCOUNTER (OUTPATIENT)
Facility: HOSPITAL | Age: 75
Setting detail: INFUSION SERIES
Discharge: HOME | End: 2025-08-02
Payer: MEDICARE

## 2025-08-02 ENCOUNTER — HOSPITAL ENCOUNTER (OUTPATIENT)
Facility: HOSPITAL | Age: 75
Setting detail: INFUSION SERIES
End: 2025-08-02

## 2025-08-02 VITALS
DIASTOLIC BLOOD PRESSURE: 77 MMHG | HEART RATE: 57 BPM | SYSTOLIC BLOOD PRESSURE: 156 MMHG | RESPIRATION RATE: 16 BRPM | TEMPERATURE: 97.3 F

## 2025-08-02 DIAGNOSIS — Z20.3 RABIES EXPOSURE: Primary | ICD-10-CM

## 2025-08-02 PROCEDURE — 6360000002 HC RX W HCPCS: Performed by: PHYSICIAN ASSISTANT

## 2025-08-02 PROCEDURE — 96372 THER/PROPH/DIAG INJ SC/IM: CPT

## 2025-08-02 PROCEDURE — 90471 IMMUNIZATION ADMIN: CPT | Performed by: PHYSICIAN ASSISTANT

## 2025-08-02 PROCEDURE — 90675 RABIES VACCINE IM: CPT | Performed by: PHYSICIAN ASSISTANT

## 2025-08-02 RX ADMIN — RABIES VACCINE 1 ML: KIT at 08:19

## 2025-08-02 ASSESSMENT — PAIN DESCRIPTION - LOCATION: LOCATION: HAND

## 2025-08-02 ASSESSMENT — PAIN SCALES - GENERAL: PAINLEVEL_OUTOF10: 2

## 2025-08-02 ASSESSMENT — PAIN DESCRIPTION - ORIENTATION: ORIENTATION: RIGHT

## 2025-08-02 ASSESSMENT — PAIN DESCRIPTION - DESCRIPTORS: DESCRIPTORS: ACHING

## 2025-08-02 NOTE — PROGRESS NOTES
[facility] Progress Note    Date: 2025    Name: Donell Busby    MRN: [id number]         : 1950    Mr. Busby Arrived ambulatory and in no distress for Rabies Injection.  Assessment was completed, no acute issues at this time, no new complaints voiced.        Mr. Busby's vitals were reviewed.  Vitals:    25 0815   BP: (!) 156/77   Pulse: 57   Resp: 16   Temp: 97.3 °F (36.3 °C)         Medications:  Medications Administered         rabies vaccine, PCE[facility] (RABAVERT) injection 1 mL Admin Date  2025 Action  Given Dose  1 mL Route  [employer] Documented By  Catherine Celis, LAWRENCE          Administered right arm.    Mr. Busby tolerated treatment well and [facility].   Patient is aware of future appointments.    Future Appointments   Date Time Provider Department Center   2025  8:30 AM DARLENE FASTTRACK 2 [facility]   2025  3:00 PM [facility]   2025  3:00 PM MACEDO FASTTRACK 1 [facility]   2025  9:00 AM Michael [place] MD [facility]       Catherine Celis RN  2025

## 2025-08-06 ENCOUNTER — HOSPITAL ENCOUNTER (OUTPATIENT)
Facility: HOSPITAL | Age: 75
Setting detail: INFUSION SERIES
Discharge: HOME | End: 2025-08-06
Payer: MEDICARE

## 2025-08-06 VITALS
DIASTOLIC BLOOD PRESSURE: 93 MMHG | OXYGEN SATURATION: 94 % | TEMPERATURE: 98.2 F | SYSTOLIC BLOOD PRESSURE: 137 MMHG | HEART RATE: 87 BPM | RESPIRATION RATE: 18 BRPM

## 2025-08-06 DIAGNOSIS — Z20.3 RABIES EXPOSURE: Primary | ICD-10-CM

## 2025-08-06 PROCEDURE — 96372 THER/PROPH/DIAG INJ SC/IM: CPT

## 2025-08-06 PROCEDURE — 90675 RABIES VACCINE IM: CPT | Performed by: PHYSICIAN ASSISTANT

## 2025-08-06 PROCEDURE — 6360000002 HC RX W HCPCS: Performed by: PHYSICIAN ASSISTANT

## 2025-08-06 PROCEDURE — 90471 IMMUNIZATION ADMIN: CPT | Performed by: PHYSICIAN ASSISTANT

## 2025-08-06 RX ADMIN — RABIES VACCINE 1 ML: KIT at 15:12

## 2025-08-13 ENCOUNTER — HOSPITAL ENCOUNTER (OUTPATIENT)
Facility: HOSPITAL | Age: 75
Setting detail: INFUSION SERIES
Discharge: HOME | End: 2025-08-13
Payer: MEDICARE

## 2025-08-13 VITALS
HEART RATE: 87 BPM | DIASTOLIC BLOOD PRESSURE: 85 MMHG | BODY MASS INDEX: 32.64 KG/M2 | SYSTOLIC BLOOD PRESSURE: 130 MMHG | RESPIRATION RATE: 15 BRPM | OXYGEN SATURATION: 94 % | TEMPERATURE: 97.9 F | WEIGHT: 220.4 LBS | HEIGHT: 69 IN

## 2025-08-13 DIAGNOSIS — Z20.3 RABIES EXPOSURE: Primary | ICD-10-CM

## 2025-08-13 PROCEDURE — 90471 IMMUNIZATION ADMIN: CPT | Performed by: PHYSICIAN ASSISTANT

## 2025-08-13 PROCEDURE — 6360000002 HC RX W HCPCS: Performed by: PHYSICIAN ASSISTANT

## 2025-08-13 PROCEDURE — 90675 RABIES VACCINE IM: CPT | Performed by: PHYSICIAN ASSISTANT

## 2025-08-13 PROCEDURE — 96372 THER/PROPH/DIAG INJ SC/IM: CPT

## 2025-08-13 RX ADMIN — RABIES VACCINE 1 ML: KIT at 15:23

## 2025-08-13 ASSESSMENT — PAIN SCALES - GENERAL: PAINLEVEL_OUTOF10: 0

## 2025-08-27 ENCOUNTER — OFFICE VISIT (OUTPATIENT)
Age: 75
End: 2025-08-27
Payer: MEDICARE

## 2025-08-27 VITALS
RESPIRATION RATE: 14 BRPM | DIASTOLIC BLOOD PRESSURE: 64 MMHG | WEIGHT: 218 LBS | SYSTOLIC BLOOD PRESSURE: 122 MMHG | BODY MASS INDEX: 32.29 KG/M2 | OXYGEN SATURATION: 98 % | HEIGHT: 69 IN | HEART RATE: 66 BPM

## 2025-08-27 DIAGNOSIS — I25.10 CORONARY ARTERY DISEASE INVOLVING NATIVE CORONARY ARTERY OF NATIVE HEART WITHOUT ANGINA PECTORIS: Primary | ICD-10-CM

## 2025-08-27 DIAGNOSIS — I10 ESSENTIAL (PRIMARY) HYPERTENSION: ICD-10-CM

## 2025-08-27 DIAGNOSIS — E78.2 MIXED HYPERLIPIDEMIA: ICD-10-CM

## 2025-08-27 DIAGNOSIS — Z87.891 HISTORY OF TOBACCO USE: ICD-10-CM

## 2025-08-27 PROCEDURE — 99214 OFFICE O/P EST MOD 30 MIN: CPT | Performed by: INTERNAL MEDICINE

## 2025-08-27 PROCEDURE — 1159F MED LIST DOCD IN RCRD: CPT | Performed by: INTERNAL MEDICINE

## 2025-08-27 PROCEDURE — 3074F SYST BP LT 130 MM HG: CPT | Performed by: INTERNAL MEDICINE

## 2025-08-27 PROCEDURE — 3078F DIAST BP <80 MM HG: CPT | Performed by: INTERNAL MEDICINE

## 2025-08-27 PROCEDURE — 93010 ELECTROCARDIOGRAM REPORT: CPT | Performed by: INTERNAL MEDICINE

## 2025-08-27 PROCEDURE — G8417 CALC BMI ABV UP PARAM F/U: HCPCS | Performed by: INTERNAL MEDICINE

## 2025-08-27 PROCEDURE — 1036F TOBACCO NON-USER: CPT | Performed by: INTERNAL MEDICINE

## 2025-08-27 PROCEDURE — 1123F ACP DISCUSS/DSCN MKR DOCD: CPT | Performed by: INTERNAL MEDICINE

## 2025-08-27 PROCEDURE — G8427 DOCREV CUR MEDS BY ELIG CLIN: HCPCS | Performed by: INTERNAL MEDICINE

## 2025-08-27 PROCEDURE — G2211 COMPLEX E/M VISIT ADD ON: HCPCS | Performed by: INTERNAL MEDICINE

## 2025-08-27 PROCEDURE — 93005 ELECTROCARDIOGRAM TRACING: CPT | Performed by: INTERNAL MEDICINE

## 2025-08-27 PROCEDURE — 3017F COLORECTAL CA SCREEN DOC REV: CPT | Performed by: INTERNAL MEDICINE

## 2025-08-27 PROCEDURE — 1126F AMNT PAIN NOTED NONE PRSNT: CPT | Performed by: INTERNAL MEDICINE

## 2025-08-27 RX ORDER — METOPROLOL SUCCINATE 25 MG/1
25 TABLET, EXTENDED RELEASE ORAL DAILY
Qty: 90 TABLET | Refills: 3 | Status: SHIPPED | OUTPATIENT
Start: 2025-08-27

## 2025-08-27 RX ORDER — LISINOPRIL 5 MG/1
5 TABLET ORAL DAILY
Qty: 90 TABLET | Refills: 3 | Status: SHIPPED | OUTPATIENT
Start: 2025-08-27

## 2025-08-27 RX ORDER — NITROGLYCERIN 0.4 MG/1
TABLET SUBLINGUAL
Qty: 25 TABLET | Refills: 3 | Status: SHIPPED | OUTPATIENT
Start: 2025-08-27

## 2025-08-27 RX ORDER — ASPIRIN 81 MG/1
81 TABLET, CHEWABLE ORAL DAILY
Qty: 90 TABLET | Refills: 3 | Status: SHIPPED | OUTPATIENT
Start: 2025-08-27

## 2025-08-27 RX ORDER — ATORVASTATIN CALCIUM 40 MG/1
40 TABLET, FILM COATED ORAL DAILY
Qty: 90 TABLET | Refills: 3 | Status: SHIPPED | OUTPATIENT
Start: 2025-08-27

## 2025-08-27 ASSESSMENT — PATIENT HEALTH QUESTIONNAIRE - PHQ9
2. FEELING DOWN, DEPRESSED OR HOPELESS: NOT AT ALL
SUM OF ALL RESPONSES TO PHQ QUESTIONS 1-9: 0
1. LITTLE INTEREST OR PLEASURE IN DOING THINGS: NOT AT ALL
SUM OF ALL RESPONSES TO PHQ QUESTIONS 1-9: 0